# Patient Record
Sex: FEMALE | Race: WHITE | NOT HISPANIC OR LATINO | Employment: FULL TIME | URBAN - METROPOLITAN AREA
[De-identification: names, ages, dates, MRNs, and addresses within clinical notes are randomized per-mention and may not be internally consistent; named-entity substitution may affect disease eponyms.]

---

## 2017-01-12 ENCOUNTER — COMMUNICATION - RIVER FALLS (OUTPATIENT)
Dept: FAMILY MEDICINE | Facility: CLINIC | Age: 42
End: 2017-01-12

## 2017-01-12 ENCOUNTER — OFFICE VISIT - RIVER FALLS (OUTPATIENT)
Dept: FAMILY MEDICINE | Facility: CLINIC | Age: 42
End: 2017-01-12

## 2017-01-12 ASSESSMENT — MIFFLIN-ST. JEOR: SCORE: 1205.14

## 2017-01-25 ENCOUNTER — OFFICE VISIT - RIVER FALLS (OUTPATIENT)
Dept: FAMILY MEDICINE | Facility: CLINIC | Age: 42
End: 2017-01-25

## 2017-01-25 ASSESSMENT — MIFFLIN-ST. JEOR: SCORE: 1205.14

## 2017-02-13 ENCOUNTER — OFFICE VISIT - RIVER FALLS (OUTPATIENT)
Dept: FAMILY MEDICINE | Facility: CLINIC | Age: 42
End: 2017-02-13

## 2017-02-13 ASSESSMENT — MIFFLIN-ST. JEOR: SCORE: 1228.28

## 2017-02-22 ENCOUNTER — OFFICE VISIT - RIVER FALLS (OUTPATIENT)
Dept: FAMILY MEDICINE | Facility: CLINIC | Age: 42
End: 2017-02-22

## 2017-02-22 ASSESSMENT — MIFFLIN-ST. JEOR: SCORE: 1188.81

## 2017-03-17 ENCOUNTER — OFFICE VISIT - RIVER FALLS (OUTPATIENT)
Dept: FAMILY MEDICINE | Facility: CLINIC | Age: 42
End: 2017-03-17

## 2017-03-17 ASSESSMENT — MIFFLIN-ST. JEOR: SCORE: 1217.84

## 2017-05-31 ENCOUNTER — OFFICE VISIT - RIVER FALLS (OUTPATIENT)
Dept: FAMILY MEDICINE | Facility: CLINIC | Age: 42
End: 2017-05-31

## 2017-05-31 ENCOUNTER — COMMUNICATION - RIVER FALLS (OUTPATIENT)
Dept: FAMILY MEDICINE | Facility: CLINIC | Age: 42
End: 2017-05-31

## 2017-05-31 ASSESSMENT — MIFFLIN-ST. JEOR: SCORE: 1211.49

## 2017-06-21 ENCOUNTER — OFFICE VISIT - RIVER FALLS (OUTPATIENT)
Dept: FAMILY MEDICINE | Facility: CLINIC | Age: 42
End: 2017-06-21

## 2018-05-24 ENCOUNTER — OFFICE VISIT - RIVER FALLS (OUTPATIENT)
Dept: FAMILY MEDICINE | Facility: CLINIC | Age: 43
End: 2018-05-24

## 2018-05-24 ASSESSMENT — MIFFLIN-ST. JEOR: SCORE: 1173.39

## 2018-05-25 LAB
CREAT SERPL-MCNC: 0.71 MG/DL (ref 0.5–1.1)
GLUCOSE BLD-MCNC: 78 MG/DL (ref 65–99)

## 2019-01-08 ENCOUNTER — OFFICE VISIT - RIVER FALLS (OUTPATIENT)
Dept: FAMILY MEDICINE | Facility: CLINIC | Age: 44
End: 2019-01-08

## 2020-01-08 ENCOUNTER — OFFICE VISIT - RIVER FALLS (OUTPATIENT)
Dept: FAMILY MEDICINE | Facility: CLINIC | Age: 45
End: 2020-01-08
Payer: COMMERCIAL

## 2020-01-08 ASSESSMENT — MIFFLIN-ST. JEOR: SCORE: 1203.33

## 2020-01-09 ENCOUNTER — COMMUNICATION - RIVER FALLS (OUTPATIENT)
Dept: FAMILY MEDICINE | Facility: CLINIC | Age: 45
End: 2020-01-09
Payer: COMMERCIAL

## 2020-01-09 LAB
A/G RATIO - HISTORICAL: 1.6 (ref 1–2.5)
ALBUMIN SERPL-MCNC: 4.4 GM/DL (ref 3.6–5.1)
ALP SERPL-CCNC: 102 UNIT/L (ref 33–115)
ALT SERPL W P-5'-P-CCNC: 18 UNIT/L (ref 6–29)
AMYLASE SERPL-CCNC: 52 UNIT/L (ref 21–101)
AST SERPL W P-5'-P-CCNC: 17 UNIT/L (ref 10–30)
BILIRUB SERPL-MCNC: 0.5 MG/DL (ref 0.2–1.2)
BUN SERPL-MCNC: 14 MG/DL (ref 7–25)
BUN/CREAT RATIO - HISTORICAL: NORMAL (ref 6–22)
CALCIUM SERPL-MCNC: 9.5 MG/DL (ref 8.6–10.2)
CHLORIDE BLD-SCNC: 106 MMOL/L (ref 98–110)
CO2 SERPL-SCNC: 27 MMOL/L (ref 20–32)
CREAT SERPL-MCNC: 0.71 MG/DL (ref 0.5–1.1)
EGFRCR SERPLBLD CKD-EPI 2021: 104 ML/MIN/1.73M2
ERYTHROCYTE [DISTWIDTH] IN BLOOD BY AUTOMATED COUNT: 12.7 % (ref 11–15)
GLOBULIN: 2.7 (ref 1.9–3.7)
GLUCOSE BLD-MCNC: 96 MG/DL (ref 65–99)
HCT VFR BLD AUTO: 38.6 % (ref 35–45)
HGB BLD-MCNC: 12.9 GM/DL (ref 11.7–15.5)
LIPASE SERPL-CCNC: 13 UNIT/L (ref 7–60)
MCH RBC QN AUTO: 27.4 PG (ref 27–33)
MCHC RBC AUTO-ENTMCNC: 33.4 GM/DL (ref 32–36)
MCV RBC AUTO: 82 FL (ref 80–100)
PLATELET # BLD AUTO: 378 10*3/UL (ref 140–400)
PMV BLD: 9.9 FL (ref 7.5–12.5)
POTASSIUM BLD-SCNC: 4.2 MMOL/L (ref 3.5–5.3)
PROT SERPL-MCNC: 7.1 GM/DL (ref 6.1–8.1)
RBC # BLD AUTO: 4.71 10*6/UL (ref 3.8–5.1)
SODIUM SERPL-SCNC: 139 MMOL/L (ref 135–146)
WBC # BLD AUTO: 6.3 10*3/UL (ref 3.8–10.8)

## 2020-02-19 ENCOUNTER — OFFICE VISIT - RIVER FALLS (OUTPATIENT)
Dept: FAMILY MEDICINE | Facility: CLINIC | Age: 45
End: 2020-02-19
Payer: COMMERCIAL

## 2020-02-19 ASSESSMENT — MIFFLIN-ST. JEOR: SCORE: 1208.77

## 2020-03-13 ENCOUNTER — OFFICE VISIT - RIVER FALLS (OUTPATIENT)
Dept: FAMILY MEDICINE | Facility: CLINIC | Age: 45
End: 2020-03-13
Payer: COMMERCIAL

## 2020-03-13 LAB — DEPRECATED S PYO AG THROAT QL EIA: NEGATIVE

## 2020-03-13 ASSESSMENT — MIFFLIN-ST. JEOR: SCORE: 1205.14

## 2020-03-15 LAB — BACTERIA SPEC CULT: NORMAL

## 2021-02-01 ENCOUNTER — OFFICE VISIT - RIVER FALLS (OUTPATIENT)
Dept: FAMILY MEDICINE | Facility: CLINIC | Age: 46
End: 2021-02-01
Payer: COMMERCIAL

## 2021-02-01 ENCOUNTER — AMBULATORY - RIVER FALLS (OUTPATIENT)
Dept: FAMILY MEDICINE | Facility: CLINIC | Age: 46
End: 2021-02-01
Payer: COMMERCIAL

## 2021-02-01 ENCOUNTER — COMMUNICATION - RIVER FALLS (OUTPATIENT)
Dept: FAMILY MEDICINE | Facility: CLINIC | Age: 46
End: 2021-02-01
Payer: COMMERCIAL

## 2021-02-04 LAB — SARS-COV-2 RNA RESP QL NAA+PROBE: NEGATIVE

## 2021-03-25 ENCOUNTER — AMBULATORY - RIVER FALLS (OUTPATIENT)
Dept: FAMILY MEDICINE | Facility: CLINIC | Age: 46
End: 2021-03-25
Payer: COMMERCIAL

## 2021-03-25 ENCOUNTER — OFFICE VISIT - RIVER FALLS (OUTPATIENT)
Dept: FAMILY MEDICINE | Facility: CLINIC | Age: 46
End: 2021-03-25
Payer: COMMERCIAL

## 2021-03-29 LAB — SARS-COV-2 RNA RESP QL NAA+PROBE: NEGATIVE

## 2021-04-20 ENCOUNTER — COMMUNICATION - RIVER FALLS (OUTPATIENT)
Dept: FAMILY MEDICINE | Facility: CLINIC | Age: 46
End: 2021-04-20
Payer: COMMERCIAL

## 2021-08-29 ENCOUNTER — OFFICE VISIT - RIVER FALLS (OUTPATIENT)
Dept: FAMILY MEDICINE | Facility: CLINIC | Age: 46
End: 2021-08-29
Payer: COMMERCIAL

## 2021-08-29 ASSESSMENT — MIFFLIN-ST. JEOR: SCORE: 1259.58

## 2021-08-30 ENCOUNTER — LAB REQUISITION (OUTPATIENT)
Dept: LAB | Facility: CLINIC | Age: 46
End: 2021-08-30
Payer: COMMERCIAL

## 2021-08-30 DIAGNOSIS — U07.1 COVID-19: ICD-10-CM

## 2021-08-30 LAB — DEPRECATED S PYO AG THROAT QL EIA: NEGATIVE

## 2021-08-30 PROCEDURE — U0003 INFECTIOUS AGENT DETECTION BY NUCLEIC ACID (DNA OR RNA); SEVERE ACUTE RESPIRATORY SYNDROME CORONAVIRUS 2 (SARS-COV-2) (CORONAVIRUS DISEASE [COVID-19]), AMPLIFIED PROBE TECHNIQUE, MAKING USE OF HIGH THROUGHPUT TECHNOLOGIES AS DESCRIBED BY CMS-2020-01-R: HCPCS | Mod: ORL | Performed by: PHYSICIAN ASSISTANT

## 2021-08-31 LAB — SARS-COV-2 RNA RESP QL NAA+PROBE: NEGATIVE

## 2021-09-01 ENCOUNTER — OFFICE VISIT - RIVER FALLS (OUTPATIENT)
Dept: FAMILY MEDICINE | Facility: CLINIC | Age: 46
End: 2021-09-01
Payer: COMMERCIAL

## 2021-09-01 LAB
BACTERIA SPEC CULT: NORMAL
SARS-COV-2 RNA RESP QL NAA+PROBE: NEGATIVE

## 2021-09-01 ASSESSMENT — MIFFLIN-ST. JEOR: SCORE: 1260.48

## 2021-09-13 ENCOUNTER — COMMUNICATION - RIVER FALLS (OUTPATIENT)
Dept: FAMILY MEDICINE | Facility: CLINIC | Age: 46
End: 2021-09-13
Payer: COMMERCIAL

## 2022-01-04 ENCOUNTER — OFFICE VISIT - RIVER FALLS (OUTPATIENT)
Dept: FAMILY MEDICINE | Facility: CLINIC | Age: 47
End: 2022-01-04
Payer: COMMERCIAL

## 2022-01-04 ENCOUNTER — LAB REQUISITION (OUTPATIENT)
Dept: LAB | Facility: CLINIC | Age: 47
End: 2022-01-04
Payer: COMMERCIAL

## 2022-01-04 DIAGNOSIS — U07.1 COVID-19: ICD-10-CM

## 2022-01-04 PROCEDURE — U0003 INFECTIOUS AGENT DETECTION BY NUCLEIC ACID (DNA OR RNA); SEVERE ACUTE RESPIRATORY SYNDROME CORONAVIRUS 2 (SARS-COV-2) (CORONAVIRUS DISEASE [COVID-19]), AMPLIFIED PROBE TECHNIQUE, MAKING USE OF HIGH THROUGHPUT TECHNOLOGIES AS DESCRIBED BY CMS-2020-01-R: HCPCS | Mod: ORL | Performed by: PHYSICIAN ASSISTANT

## 2022-01-05 LAB — SARS-COV-2 RNA RESP QL NAA+PROBE: POSITIVE

## 2022-01-06 LAB — SARS-COV-2 RNA RESP QL NAA+PROBE: POSITIVE

## 2022-01-24 ENCOUNTER — OFFICE VISIT - RIVER FALLS (OUTPATIENT)
Dept: FAMILY MEDICINE | Facility: CLINIC | Age: 47
End: 2022-01-24
Payer: COMMERCIAL

## 2022-01-25 ENCOUNTER — COMMUNICATION - RIVER FALLS (OUTPATIENT)
Dept: FAMILY MEDICINE | Facility: CLINIC | Age: 47
End: 2022-01-25
Payer: COMMERCIAL

## 2022-01-25 LAB
ALBUMIN UR-MCNC: NEGATIVE G/DL
APPEARANCE UR: CLEAR
BACTERIA #/AREA URNS HPF: ABNORMAL /HPF
BILIRUB UR QL STRIP: NEGATIVE
BUN SERPL-MCNC: 16 MG/DL (ref 7–25)
BUN/CREAT RATIO - HISTORICAL: NORMAL (ref 6–22)
CALCIUM SERPL-MCNC: 9.4 MG/DL (ref 8.6–10.2)
CHLORIDE BLD-SCNC: 106 MMOL/L (ref 98–110)
CO2 SERPL-SCNC: 25 MMOL/L (ref 20–32)
COLOR UR AUTO: YELLOW
CREAT SERPL-MCNC: 0.71 MG/DL (ref 0.5–1.1)
EGFRCR SERPLBLD CKD-EPI 2021: 102 ML/MIN/1.73M2
GLUCOSE BLD-MCNC: 75 MG/DL (ref 65–99)
GLUCOSE UR STRIP-MCNC: NEGATIVE MG/DL
HGB UR QL STRIP: NEGATIVE
HYALINE CASTS #/AREA URNS LPF: ABNORMAL /LPF
KETONES UR STRIP-MCNC: ABNORMAL MG/DL
LEUKOCYTE ESTERASE UR QL STRIP: NEGATIVE
NITRATE UR QL: NEGATIVE
PH UR STRIP: 6 [PH] (ref 5–8)
POTASSIUM BLD-SCNC: 4.1 MMOL/L (ref 3.5–5.3)
RBC #/AREA URNS AUTO: ABNORMAL /HPF
SODIUM SERPL-SCNC: 140 MMOL/L (ref 135–146)
SP GR UR STRIP: 1.02 (ref 1–1.03)
SQUAMOUS #/AREA URNS AUTO: ABNORMAL /HPF
TSH SERPL DL<=0.005 MIU/L-ACNC: 1.37 MIU/L
WBC #/AREA URNS AUTO: ABNORMAL /HPF

## 2022-01-30 ENCOUNTER — COMMUNICATION - RIVER FALLS (OUTPATIENT)
Dept: FAMILY MEDICINE | Facility: CLINIC | Age: 47
End: 2022-01-30
Payer: COMMERCIAL

## 2022-02-11 VITALS
HEART RATE: 81 BPM | SYSTOLIC BLOOD PRESSURE: 118 MMHG | BODY MASS INDEX: 30.43 KG/M2 | TEMPERATURE: 97.9 F | TEMPERATURE: 98.1 F | BODY MASS INDEX: 30.47 KG/M2 | WEIGHT: 155 LBS | HEIGHT: 60 IN | SYSTOLIC BLOOD PRESSURE: 111 MMHG | WEIGHT: 155.2 LBS | OXYGEN SATURATION: 98 % | HEIGHT: 60 IN | HEART RATE: 82 BPM | RESPIRATION RATE: 18 BRPM | DIASTOLIC BLOOD PRESSURE: 76 MMHG | DIASTOLIC BLOOD PRESSURE: 64 MMHG

## 2022-02-12 VITALS
DIASTOLIC BLOOD PRESSURE: 60 MMHG | TEMPERATURE: 97.4 F | OXYGEN SATURATION: 99 % | HEART RATE: 76 BPM | WEIGHT: 142.6 LBS | WEIGHT: 143.8 LBS | DIASTOLIC BLOOD PRESSURE: 70 MMHG | HEIGHT: 60 IN | BODY MASS INDEX: 28 KG/M2 | HEART RATE: 74 BPM | BODY MASS INDEX: 28.23 KG/M2 | HEIGHT: 60 IN | TEMPERATURE: 98.1 F | OXYGEN SATURATION: 98 % | SYSTOLIC BLOOD PRESSURE: 110 MMHG | SYSTOLIC BLOOD PRESSURE: 116 MMHG

## 2022-02-12 VITALS
TEMPERATURE: 99.1 F | HEIGHT: 60 IN | HEART RATE: 84 BPM | WEIGHT: 136 LBS | BODY MASS INDEX: 26.7 KG/M2 | SYSTOLIC BLOOD PRESSURE: 100 MMHG | DIASTOLIC BLOOD PRESSURE: 62 MMHG

## 2022-02-12 VITALS
HEART RATE: 100 BPM | HEIGHT: 60 IN | HEIGHT: 60 IN | SYSTOLIC BLOOD PRESSURE: 104 MMHG | BODY MASS INDEX: 28.35 KG/M2 | HEART RATE: 87 BPM | TEMPERATURE: 99.7 F | DIASTOLIC BLOOD PRESSURE: 60 MMHG | SYSTOLIC BLOOD PRESSURE: 104 MMHG | DIASTOLIC BLOOD PRESSURE: 66 MMHG | TEMPERATURE: 98.8 F | OXYGEN SATURATION: 97 % | WEIGHT: 144.4 LBS | WEIGHT: 145.8 LBS | BODY MASS INDEX: 28.62 KG/M2

## 2022-02-12 VITALS
SYSTOLIC BLOOD PRESSURE: 108 MMHG | HEART RATE: 84 BPM | HEIGHT: 60 IN | HEART RATE: 80 BPM | BODY MASS INDEX: 29.08 KG/M2 | BODY MASS INDEX: 28.07 KG/M2 | WEIGHT: 148.1 LBS | DIASTOLIC BLOOD PRESSURE: 64 MMHG | HEART RATE: 72 BPM | HEIGHT: 60 IN | DIASTOLIC BLOOD PRESSURE: 73 MMHG | SYSTOLIC BLOOD PRESSURE: 100 MMHG | TEMPERATURE: 97.9 F | WEIGHT: 139.4 LBS | HEIGHT: 60 IN | TEMPERATURE: 98.6 F | BODY MASS INDEX: 28.07 KG/M2 | HEART RATE: 68 BPM | TEMPERATURE: 98.6 F | DIASTOLIC BLOOD PRESSURE: 64 MMHG | SYSTOLIC BLOOD PRESSURE: 114 MMHG | BODY MASS INDEX: 27.37 KG/M2 | HEIGHT: 60 IN | WEIGHT: 143 LBS | WEIGHT: 143 LBS | DIASTOLIC BLOOD PRESSURE: 62 MMHG | SYSTOLIC BLOOD PRESSURE: 110 MMHG

## 2022-02-12 VITALS
DIASTOLIC BLOOD PRESSURE: 68 MMHG | SYSTOLIC BLOOD PRESSURE: 102 MMHG | HEART RATE: 88 BPM | TEMPERATURE: 99.1 F | BODY MASS INDEX: 27.97 KG/M2 | WEIGHT: 143.2 LBS

## 2022-02-12 VITALS
BODY MASS INDEX: 28.07 KG/M2 | TEMPERATURE: 98.6 F | HEART RATE: 72 BPM | SYSTOLIC BLOOD PRESSURE: 112 MMHG | DIASTOLIC BLOOD PRESSURE: 60 MMHG | WEIGHT: 143 LBS | HEIGHT: 60 IN

## 2022-02-12 VITALS
SYSTOLIC BLOOD PRESSURE: 102 MMHG | WEIGHT: 142.6 LBS | TEMPERATURE: 99.5 F | HEART RATE: 73 BPM | BODY MASS INDEX: 27.85 KG/M2 | DIASTOLIC BLOOD PRESSURE: 60 MMHG | OXYGEN SATURATION: 98 %

## 2022-02-15 NOTE — LETTER
(Inserted Image. Unable to display)                                                                                                [address] [facility] [address]                                                January 24, 2020Re: CAMRYN EMIL1975 [facility] [address] [place] [address] 14055Xo: Dr. ChowdhuryThe following patient has been referred to your office/practice:  CAMRYN STEIN Appointment:  1/29/2020 @3:30pmPlease refer to the attached  clinical documentation for a summary of CAMRYN's care.  Please do not hesitate to contact our office if any additional clinical questions arise.  All relevant records and transition of care documents should be mailed or faxed.Your assistance in providing continuity of care is appreciated. Sincerely, [facility] [place] [place] [address](P) [phone](F) [phone]

## 2022-02-15 NOTE — TELEPHONE ENCOUNTER
---------------------  From: Willi LOZANO, Annabelle FELIZ (Phone Messages Pool ([id number]_[place]))   To: Phone Messages Pool ([id number]_[place]);     Sent: 1/9/2020 11:42:41 AM CST  Subject: Call back     Patient LVGRADY at 1139 stating she missed a call.    It is OK to LVGRADY on phone [phone] with any information.    Appears BROOKLYNN was going to call with results?---------------------  From: Amanda Snyder CMA (Phone Messages Pool ([id number]_[place]))   To: Sergei Correa PA-C;     Sent: 1/9/2020 12:11:37 PM CST  Subject: FW: Call back---------------------  From: Sergei Correa PA-C   To: Phone Messages Pool ([id number]_[facility]);     Sent: 1/9/2020 2:17:17 PM CST  Subject: RE: Call back     I sent her labs through portal. Perhaps referrals or hospital calling her to set up HIDA scan.    Alisia. Let her know this.

## 2022-02-15 NOTE — TELEPHONE ENCOUNTER
---------------------  From: Senait Melara CMA (eRx Pool ([id number]_[place]))   To: Sergei Correa PA-C;     Sent: 4/13/2020 9:21:54 AM CDT  Subject: FW: Medication Management   Due Date/Time: 4/12/2020 12:48:00 PM CDT             ** Patient matched by Senait Melara CMA on 4/13/2020 9:21:19 AM CDT **      ------------------------------------------  From: [employer]  To: Sergei Correa PA-C  Sent: April 11, 2020 12:48:58 PM CDT  Subject: Medication Management  Due: April 12, 2020 12:48:58 PM CDT    ** On Hold Pending Signature **  Drug: fluticasone nasal (fluticasone 50 mcg/inh nasal spray)  ****PT NEEDS NEW RX***************  Quantity: 1 gm  Days Supply: 1  Refills: 0  Substitutions Allowed  Notes from Pharmacy:     Dispensed Drug: fluticasone nasal (fluticasone 50 mcg/inh nasal spray)  ****PT NEEDS NEW RX***************  Quantity: 1 gm  Days Supply: 1  Refills: 0  Substitutions Allowed  Notes from Pharmacy:   ---------------------------------------------------------------  From: Sergei Correa PA-C   To: [employer]    Sent: 4/13/2020 9:30:31 AM CDT  Subject: FW: Medication Management     ** Approved **  fluticasone nasal (fluticasone propionate 50 mcg/actuation nasal spray,suspension)  ****PT NEEDS NEW RX***************  Qty:  1 gm        Days Supply:  1        Refills:  0          Substitutions Allowed     Route To Pharmacy - [facility]

## 2022-02-15 NOTE — PROGRESS NOTES
Patient:   CAMRYN STEIN            MRN: [id number]            FIN: [id number]               Age:   43 years     Sex:  Female     :  1975   Associated Diagnoses:   Recurrent maxillary sinusitis   Author:   Sergei Correa PA-C      Report Summary   Diagnosis  Recurrent maxillary sinusitis (TWB84-XC J01.01).  Patient InstructionsOrders   Visit Information      Primary Care Provider (PCP):  Sergei Correa PA-C    NPI# [id number]   Visit type:  New symptom.    Source of history:  Self.    Referral source:  Self.    History limitation:  None.       Chief Complaint   2019 10:58 AM CST    c/o fever, cough, sore throat, nasal congestion and runny nose x 3 days        History of Present Illness             The patient presents with sinus problem.  The sinus problem is located in the maxillary sinus.  The sinus problem is characterized by nasal congestion, rhinorrhea, headache and facial pain.  The severity of the sinus problem is moderate.  The sinus problem is episodic, fluctuates in intensity and is worsening.  The sinus problem has lasted for 6 day(s).  Associated symptoms consist of cough and sore throat.  Watery right eye with decreased vision. Has not had eye exam in a number of years. CC above noted and confirmed with the patient..        Review of Systems   Constitutional:  Negative except as documented in history of present illness.    Eye:  Negative except as documented in history of present illness.    Ear/Nose/Mouth/Throat:  Negative except as documented in history of present illness.    Respiratory:  Negative except as documented in history of present illness.       Health Status   Allergies:    Allergic Reactions (All)  Severity Not Documented  Amoxicillin (No reactions were documented)  Doxycycline (Rash, itchy)  Penicillin (No reactions were documented)  Sulfa drug (No reactions were documented)  Canceled/Inactive Reactions (All)  No known allergies   Medications:  (Selected)    Prescriptions  Prescribed  Nasonex 50 mcg/inh nasal spray: 2 spray(s), nasal, daily, PRN: for allergy symptoms, # 1 EA, 11 Refill(s), Type: Maintenance, Pharmacy: [id number], 2 spray(s) nasal daily,PRN:for allergy symptoms  Singulair 10 mg oral tablet: 1 tab(s) ( 10 mg ), PO, qPM, # 30 tab(s), 11 Refill(s), Type: Maintenance, Pharmacy: [facility] #[id number], 1 tab(s) po qpm  Xyzal 5 mg oral tablet: 1 tab(s) ( 5 mg ), po, qpm, # 30 tab(s), 11 Refill(s), Type: Maintenance, Pharmacy: [facility] #[id number], 1 tab(s) po qpm  Zithromax 250 mg oral tablet: = 1 packet(s), PO, Once, Instructions: as directed on package labeling. Two tablets po on day one, then one tablet daily x four days, # 6 tab(s), 0 Refill(s), Type: Soft Stop, Pharmacy: [facility] #[id number], 1 packet(s) Oral once,Instr:as directed on...  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  Comments:  3/18/2013 CDT 12:14 PM CDT - Marlene WILSON, [facility]  Colposcopy neg. OK to re-pap one year.  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  Depression, Recurrent (296.30)  Tobacco user (305.1)  Family History of Thyroid Disease  (V18.19)  Resolved  Chicken Pox (052.9): Onset in 1983 at 7 years.  Resolved.  Asthma (493.90):  Resolved.  Seasonal Allergies (477.9):  Resolved.  Pregnancy (882316718):  Resolved in 1995 at 19 years.   Family History:    Diabetes mellitus  Mother  Hypertension  Father  Depression  Mother  Aunt     Procedure history:    Vaginal hysterectomy (694396211) on 2/17/2017 at 42 Years.  LEEP ([id number]) on 5/10/2004 at 29 Years.  bunionectomy with osteotomy base of left foot in 2004 at 29 Years.  Colposcopy ([id number]).  Comments:  10/19/2012 1:18 PM CDT - Carleen Washington  '93, '95, '04   Social History:        Alcohol Assessment: Denies Alcohol Use                     Comments:                      02/22/2017 - Yissel Gallagher LPN                     Denies alcohol use      Tobacco Assessment: Current                     Comments:                      02/14/2013 - Staci Lisa CMA                     5 per day      Substance Abuse Assessment: Denies Substance Abuse      Employment and Education Assessment            Work/School description: .      Home and Environment Assessment            Marital status: .  Spouse/Partner name: Ramesh.      Exercise and Physical Activity Assessment: Does not exercise            Exercise frequency: Never.      Sexual Assessment            Sexually active: Yes.      Other Assessment            First menses age 12.  Regular menses.  Menstrual duration 3-5 days.  Cycle interval 28-30 days.  History of               abnormal Pap smear.      Physical Examination   Vital Signs   1/8/2019 10:58 AM CST Temperature Tympanic 99.5 DegF    Peripheral Pulse Rate 100 bpm    HR Method Electronic    Systolic Blood Pressure 102 mmHg    Diastolic Blood Pressure 60 mmHg    Mean Arterial Pressure 74 mmHg    BP Site Left arm    BP Method Manual    Oxygen Saturation 98 %      Measurements from flowsheet : Measurements   1/8/2019 10:58 AM CST    Weight Measured - Standard                142.6 lb      General:  Alert and oriented, No acute distress.    Eye:  Pupils are equal, round and reactive to light, Extraocular movements are intact, Normal conjunctiva.    HENT:  Normocephalic, Tympanic membranes are clear, Oral mucosa is moist.         Nose: Left nostril, Discharge ( Moderate amount, Green ).         Sinus: Left, Maxillary sinus, Tenderness.    Neck:  Supple, Non-tender, No lymphadenopathy.    Respiratory:  Lungs are clear to auscultation, Respirations are non-labored.    Cardiovascular:  Normal rate, Regular rhythm, No murmur.       Impression and Plan   Diagnosis     Recurrent maxillary sinusitis (QEU25-BL J01.01).     Patient Instructions:       Counseled: Patient, Regarding diagnosis, Regarding medications, Activity, Verbalized understanding.    Orders     Orders (Selected)   Prescriptions  Prescribed  Zithromax 250 mg oral tablet: = 1 packet(s), PO, Once, Instructions: as directed on package labeling. Two tablets po on day one, then one tablet daily x four days, # 6 tab(s), 0 Refill(s), Type: Soft Stop, Pharmacy: [facility] #[id number], 1 packet(s) Oral once,Instr:as directed on....     Take medicine as prescribed, side effects discussed.  Tylenol/ibuprofen for fever and discomfort.  Push fluids.  RTC if not improving in 36-48 hours, prior if concerns as we have discussed.  To see eye doctor for eye complaints above.

## 2022-02-15 NOTE — NURSING NOTE
Rapid Strep POC Entered On:  8/30/2021 8:08 AM CDT    Performed On:  8/29/2021 8:08 AM CDT by Tatianna Caruso               Rapid Strep POC   Rapid Strep POC :   Negative   POC Test Comments :   Performed at [facility]   Taitanna Caruso - 8/30/2021 8:08 AM CDT

## 2022-02-15 NOTE — PROGRESS NOTES
Patient:   CAMRYN STEIN            MRN: [id number]            FIN: [id number]               Age:   45 years     Sex:  Female     :  1975   Associated Diagnoses:   Acute recurrent maxillary sinusitis; Spider veins of limb   Author:   Sergei Correa PA-C      Report Summary   Diagnosis  Acute recurrent maxillary sinusitis (KHM29-RC J01.01).  Patient InstructionsOrders   Visit Information      Date of Service: 2020 01:32 pm  Performing Location: [facility]  Encounter#: [id number]      Primary Care Provider (PCP):  Sergei Correa PA-C    NPI# [id number]      Referring Provider:  Sergei Correa PA-C    NPI# [id number]   Visit type:  New symptom.    Source of history:  Self.    Referral source:  Self.    History limitation:  None.       Chief Complaint   2020 1:37 PM CST    c/o HA, sinus pressure, ear pain, sore throat x 1 week; getting worse; tried OTC sudafed, aspirin        History of Present Illness             The patient presents with sinus problem.  The sinus problem is located in the maxillary sinus.  The sinus problem is characterized by nasal congestion, rhinorrhea and facial pain.  The severity of the sinus problem is moderate.  The sinus problem is episodic, fluctuates in intensity and is worsening.  The sinus problem has lasted for 1 week(s).  Associated symptoms consist of cough and sore throat.  CC above noted and confirmed with the patient..  Check spider veins of right leg. On feet all day at work. Legs occasionally ache at night. Dry skin..        Review of Systems   Constitutional:  Negative except as documented in history of present illness.    Eye:  Negative.    Ear/Nose/Mouth/Throat:  Negative except as documented in history of present illness.    Respiratory:  Negative except as documented in history of present illness.       Health Status   Allergies:    Allergic Reactions (All)  Severity Not Documented  Amoxicillin (No reactions were documented)  Doxycycline (Rash,  itchy)  Penicillin (No reactions were documented)  Sulfa drug (No reactions were documented)  Canceled/Inactive Reactions (All)  No known allergies   Medications:  (Selected)   Prescriptions  Prescribed  Zithromax 250 mg oral tablet: = 1 packet(s), PO, Once, Instructions: as directed on package labeling. Two tablets po on day one, then one tablet daily x four days, # 6 tab(s), 0 Refill(s), Type: Soft Stop, Pharmacy: [facility] , 1 packet(s) Oral once,Instr:...,    Medications          *denotes recorded medication          Zithromax 250 mg oral tablet: 1 packet(s), PO, Once, as directed on package labeling. Two tablets po on day one, then one tablet daily x four days, 6 tab(s), 0 Refill(s).       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  Comments:  3/18/2013 CDT 12:14 PM CDT - Marlene WILSON, [facility]  Colposcopy neg. OK to re-pap one year.  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  Depression, Recurrent (296.30)  Tobacco user (305.1)  Family History of Thyroid Disease (V18.19)  Resolved  Chicken Pox (052.9): Onset in 1983 at 7 years.  Resolved.  Asthma (493.90):  Resolved.  Seasonal Allergies (477.9):  Resolved.  Pregnancy (577480796):  Resolved in 1995 at 19 years.   Family History:    Diabetes  mellitus  Mother  Hypertension  Father  Depression  Mother  Aunt     Procedure history:    Vaginal hysterectomy ([id number]) on 2/17/2017 at 42 Years.  LEEP ([id number]) on 5/10/2004 at 29 Years.  bunionectomy with osteotomy base of left foot in 2004 at 29 Years.  Colposcopy ([id number]).  Comments:  10/19/2012 1:18 PM CDT - Carleen Washington  '93, '95, '04   Social History:        Alcohol Assessment: Denies Alcohol Use                     Comments:                      02/22/2017 - Yissel Gallagher LPN                     Denies alcohol use      Tobacco Assessment: Current                     Comments:                      02/14/2013 - Staci Lisa CMA                     5 per day      Substance Abuse Assessment: Denies Substance Abuse      Employment and Education Assessment            Work/School description: .      Home and Environment Assessment            Marital status: .  Spouse/Partner name: Ramesh.      Exercise and Physical Activity Assessment: Does not exercise            Exercise frequency: Never.      Sexual Assessment            Sexually active: Yes.      Other Assessment            First menses age 12.  Regular menses.  Menstrual duration 3-5 days.  Cycle interval 28-30 days.  History of               abnormal Pap smear.        Physical Examination   Vital Signs   2/19/2020 1:37 PM CST Temperature Tympanic 98.1 DegF    Peripheral Pulse Rate 76 bpm    HR Method Electronic    Systolic Blood Pressure 110 mmHg    Diastolic Blood Pressure 70 mmHg    Mean Arterial Pressure 83 mmHg    BP Site Left arm    BP Method Manual    Oxygen Saturation 99 %      Measurements from flowsheet : Measurements   2/19/2020 1:37 PM CST Height Measured - Standard 60 in    Weight Measured - Standard 143.8 lb    BSA 1.66 m2    Body Mass Index 28.08 kg/m2  HI      General:  Alert and oriented, No acute distress.    Eye:  Pupils are equal, round and reactive to light, Extraocular movements are intact, Normal  conjunctiva.    HENT:  Normocephalic, Tympanic membranes are clear, Oral mucosa is moist.         Nose: Both nostrils, Discharge ( Moderate amount, Green ).         Sinus: Bilateral, Maxillary sinus, Tenderness.    Neck:  Supple, Non-tender, No lymphadenopathy.    Respiratory:  Lungs are clear to auscultation, Respirations are non-labored.    Cardiovascular:  Normal rate, Regular rhythm, No murmur.    Integumentary:  Spider veins lateral right leg just inferior to the knee. Superficial varicosities right leg. No edema. No erythema. No calf pain. Dry hands and legs.       Impression and Plan   Diagnosis     Acute recurrent maxillary sinusitis (WGI67-RG J01.01).     Spider veins of limb (YFS68-EQ I78.1).     Patient Instructions:       Counseled: Patient, Regarding diagnosis, Regarding medications, Activity, Verbalized understanding.    Orders     Orders (Selected)   Prescriptions  Prescribed  Zithromax 250 mg oral tablet: = 1 packet(s), PO, Once, Instructions: as directed on package labeling. Two tablets po on day one, then one tablet daily x four days, # 6 tab(s), 0 Refill(s), Type: Soft Stop, Pharmacy: [facility] , 1 packet(s) Oral once,Instr:....     Take medicine as prescribed, side effects discussed.  Tylenol/ibuprofen for fever and discomfort.  Push fluids.  RTC if not improving in 36-48 hours, prior if concerns as we have discussed.  Compression stockings. Tepid water for showers. Short showers. Moisturizing creams. FU PRN. Elevate legs in melissa if seated.

## 2022-02-15 NOTE — TELEPHONE ENCOUNTER
---------------------  From: Fatoumata Ramirez LPN (Phone Messages Pool ([id number]_[facility]))   Sent: 1/10/2020 12:33:08 PM CST  Subject: General Message     Phone Message    PCP:   KAH      Time of Call:  11:55am       Person Calling:  pt  Phone number:  [phone]    Returned call at:    Note:   Pt LM stating she is suppose to be getting a call to schedule a procedure. Pt says she needs to know what the procedure is and if there is a code so that she can contact her insurance to see if it will be covered.    Retuned call and informed pt order is for NM Hepatobiliary or HIDA scan. Dx code on order is abdominal pain (R10.9).    Told pt order was brought to [facility] today so she should be receiving call.    Last office visit and reason:  1/8/2020 Upper abdominal pain

## 2022-02-15 NOTE — TELEPHONE ENCOUNTER
---------------------  From: Reina RAVI Tiffanie (Phone Messages Pool ([id number]))   To: KAH Message [employer] ([id number]);     Sent: 3/24/2021 9:38:22 AM CDT  Subject: General Message--? sinus inf/bronchitis     Phone Message    PCP:   KAH      Time of Call:  0909       Person Calling:  self  Phone number:  [phone]      Note:   pt had a visit 2/1 and was tested negative for Covid, Z-waqar given.  States she feels she still has sinus inf/bronchitis - c/o sinus pressure, cough, increased nasal drainage in the am with green d/c.  Prod cough with white to clear discharge.  Was treating for allergies with no improvement with symptoms (has allergies all year round).  denies any temp.  Had ST Sun/Mon which has now cleared Started Tylenol cold/flu with nor relief of sx.  Hoping she can avoid another OV.  Please advise      Allergy PCN, Sulfa, Doxy       Last office visit and reason:  2/1---------------------  From: Bradley Cummings LPN ([employer] Message Pool ([id number]))   To: [employer] Message Pool ([id number]);     Sent: 3/24/2021 1:41:48 PM CDT  Subject: FW: General Message--? sinus inf/bronchitis     Please advise if the patient should be seen in clinic for evaluation or if a virtual visit will suffice.---------------------  From: Bradley Cummings LPN ([employer] Message Pool ([id number]))   To: Appointment Pool ([id number]_[place]);     Sent: 3/24/2021 2:13:25 PM CDT !  Subject: FW: General Message--? sinus inf/bronchitis     Spoke with provider who wants patient scheduled for a video or phone visit this afternoon.  Routing to scheduling.patient wants to call her insurance to see how much they will cover of this call before scheduling.  She will contact us.done

## 2022-02-15 NOTE — PROGRESS NOTES
Patient:   LIBERTY WILLIAMSON            MRN: [id number]            FIN: [id number]               Age:   42 years     Sex:  Female     :  1975   Associated Diagnoses:   None   Author:   Sp GUTIERREZ, Jordana Baron   Ms. Liberty Williamson is a 42 year old female who presents for post-op visit following vaginal hysterectomy with Dr. Ge on 17.  The indication was severe dysmenorrhea and metromenorrhagia.  Liberty is progressing generally well for POD #5.  She continues to experience pelvic cramping, but this is slowly plateauing.  She was taking oxycodone 5 mg q4hr for this on POD#2-3, but has now switched to alternating hot/cool compresses and Aleve for managing the pain.  She did take a Percocet yesterday evening that she had at home, and this was very helpful in relieving the cramps.  She had some light spotting post-operatively, which had been tapering off  since ; she is still wearing pads due to occassional bladder leakage (which is a problem for her at baseline and is unhanged since the surgery).   She is having regular bowel movements with Senna. Her appetite is decreased, but she still manages to eat a few small meals during the day. Denies chest pain, shortness of breath, or leg pain or swelling.     Liberty's PHQ-9 today revealed a score of 5.  She has a history of depression and has been on Bupropion for this and cocomitant indication of smoking cessation since 2016.  She reports noting improvement in her urge to smoke since being on the medication-- and has been successful in reducing the number of cigarettes she is smoking on an average day, now down to ~4-- but has noticed little impact on her depressive symptoms.  Her primary symptoms today are anhedonia, feeling down/hopeless, lack of energy, and avoidance of daily activities such as getting out of bed or going to work--with resultant anxiety from the procrastination.  She denies suicidal or homicidal ideation.  She would be  interested in switching her antidepressant to Prozac, which she believes was helpful for her in the past.         Health Status   Allergies:    Allergic Reactions (Selected)  Severity Not Documented  Amoxicillin (No reactions were documented)  Doxycycline (Rash, itchy)  Penicillin (No reactions were documented)  Sulfa drug (No reactions were documented)   Medications:  (Selected)   Prescriptions  Prescribed  FLUoxetine 20 mg oral tablet: 1 tab(s) ( 20 mg ), PO, Daily, # 30 tab(s), 1 Refill(s), Type: Maintenance, Pharmacy: [facility] #[id number], 1 tab(s) po daily  meclizine 25 mg oral tablet: 1 tab(s) ( 25 mg ), PO, TID, PRN: for dizziness, # 30 tab(s), 0 Refill(s), Type: Maintenance, Pharmacy: [facility] #[id number], 1 tab(s) po tid,PRN:for dizziness  omeprazole 20 mg oral delayed release capsule: 1 cap(s) ( 20 mg ), PO, Daily, # 90 cap(s), 0 Refill(s), Type: Maintenance, Pharmacy: [facility] #[id number], 1 cap(s) po daily  Documented Medications  Documented  oxyCODONE: po, 0 Refill(s), Type: Maintenance   Problem list:    All Problems (Selected)  ASCUS with positive high risk HPV / ICD-9-.15 / Confirmed  Depression, Recurrent / ICD-9-.30 / Confirmed  Family History of Thyroid Disease / ICD-9-CM V18.19 / Confirmed  LGSIL on Pap Smear / ICD-9-.03 / Confirmed  Tobacco user / ICD-9-.1 / Confirmed      Objective   Vital Signs   2/22/2017 9:17 AM CST Temperature Temporal 98.6 DegF    Peripheral Pulse Rate 84 bpm    Pulse Site Radial artery    Systolic Blood Pressure 110 mmHg    Diastolic Blood Pressure 64 mmHg    Mean Arterial Pressure 79 mmHg    BP Site Right arm    BP Method Manual      Measurements from flowsheet : Measurements   2/22/2017 9:17 AM CST Height Measured - Standard 60 in    Weight Measured - Standard 139.4 lb    BSA 1.63 m2    Body Mass Index 27.22 kg/m2      Gen: Alert, oriented, middle-aged woman. Walking around exam room and shifting in seat, trying to get comfortable.   CV:  Regular rate and rhythm, no murmurs, rubs, or gallops.   Pulm: Lungs clear to auscultation bilaterally. No wheezes or crackles.   Abd: Soft, non-distended, non-tender abdomen.   LE: No edema.       Results Review   No new studies today.       Impression and Plan   Ms. Liberty Williamson is a 42 year old female who presents for post-operative check following vaginal hysterectomy for dysmenorrhea and metromenorrhagia on 2/17/17.  She is progressing as expected for POD#5.  She notes her depression is not adequately treated on current regimen,with residual symptoms of fatigue, depressed mood, and avoidance of daily activities.      1. Post-operative check s/p vaginal hysterectomy  -Progressing as expected  -Encouraged her to take OTC Aleve 220 mg twice daily for pain control  -Oxycodone 5 mg tab at night as needed for breakthrough pain control prior to sleep    2. Depression  -Discontinue bupropion due to lack of effect on depressive symptoms  -Start fluoxetine 20 mg daily  -Instructed to contact clinic in approximately 4 weeks to reassess depressive symptoms.         Staci Gaffney, Medical Student     Patient was seen with student and history and exam confirmed. Agree that documentation reflects findings and plan.  Jordana Snowden MD

## 2022-02-15 NOTE — NURSING NOTE
Rapid Strep POC Entered On:  3/13/2020 4:47 PM CDT    Performed On:  3/13/2020 4:46 PM CDT by Meena Pratt MA               Rapid Strep POC   Rapid Strep POC :   Negative   POC Test Comments :   S/O Confirmation   Meena Pratt MA - 3/13/2020 4:46 PM CDT   Details   Collection Date :   3/13/2020 4:45 PM CDT   Handling Specimen POC :   Throat   POC Test Comments :   Lab Test Preformed by:   [facility] Office  [address] [place] [id number]  Phone: [phone]  Fax: [phone]     Meena Pratt MA - 3/13/2020 4:46 PM CDT

## 2022-02-15 NOTE — TELEPHONE ENCOUNTER
---------------------  From: Sergio/Shana SANCHEZ (Phone Messages Pool ([id number]))   To: BAM Message Pool ([id number]);     Sent: 9/13/2021 2:03:34 PM CDT  Subject: FW: Fmla papers           ---------------------  From: CAMRYN STEIN  To: [facility]  Sent: 09/13/2021 01:58 p.m. CDT  Subject: Fmla papers  I called and left message for Dr. Marcelina Mathur. [facility] exc[facility] me from work. I received a message from them stating they are due back by Sept.20th if there are any questions please call me [phone]  Thank you---------------------  From: Elgin Dockery CMA ([employer] Message Pool ([id number]))   To: Roula Callahan PA-C;     Sent: 9/15/2021 2:36:40 PM CDT  Subject: FW: Fmla papers---------------------  From: Roula Callahan PA-C   To: BAM Message Pool ([id number]);     Sent: 9/15/2021 2:55:15 PM CDT  Subject: RE: Fmla papers     They are in my outbox completed I believe, if not there [place] may have picked them Roula Kulkarni completed your FMLA paperwork and it was faxed this morning to [place] [employer] f-[phone].  Confirmation received.  Please let us know if there is anything else we can do for you.  Have a nice day. Elgin Dockery CMA---------------------  From: Elgin Dockery CMA (BAM Message Pool ([id number]))   To: CAMRYN STEIN    Sent: 9/16/2021 12:31:14 PM CDT  Subject: FW: Fmla papers

## 2022-02-15 NOTE — PROGRESS NOTES
Patient:   CAMRYN STEIN            MRN: [id number]            FIN: [id number]               Age:   42 years     Sex:  Female     :  1975   Associated Diagnoses:   Left maxillary sinusitis; Seasonal Allergies   Author:   Sergei Correa PA-C      Visit Information      Primary Care Provider (PCP):  Sergei Correa PA-C    NPI# [id number]   Visit type:  New symptom.    Source of history:  Self.    Referral source:  Self.    History limitation:  None.       Chief Complaint   2017 3:27 PM CDT    Left facial pressure, teeth pain,cough x 2 days;        History of Present Illness             The patient presents with sinus problem.  The sinus problem is located in the maxillary sinus.  The sinus problem is characterized by nasal congestion, rhinorrhea, headache and facial pain.  The severity of the sinus problem is moderate.  The sinus problem is episodic, fluctuates in intensity and is worsening.  Perennial allergic rhinitis. Would like to try different medications. Sick for one month. Now worsening cough and left maxillary pain. CC above noted and confirmed with the patient. Low grade fever..        Review of Systems   Constitutional:  Negative except as documented in history of present illness.    Eye:  Negative.    Ear/Nose/Mouth/Throat:  Negative except as documented in history of present illness.    Respiratory:  Negative except as documented in history of present illness.       Health Status   Allergies:    Allergic Reactions (All)  Severity Not Documented  Amoxicillin (No reactions were documented)  Doxycycline (Rash, itchy)  Penicillin (No reactions were documented)  Sulfa drug (No reactions were documented)  Canceled/Inactive Reactions (All)  No known allergies   Medications:  (Selected)   Prescriptions  Prescribed  FLUoxetine 20 mg oral tablet: 1 tab(s) ( 20 mg ), PO, Daily, # 90 tab(s), 3 Refill(s), Type: Maintenance, Pharmacy: [facility] #[id number], 1 tab(s) po daily  Nasonex 50 mcg/inh nasal spray:  2 spray(s), nasal, daily, PRN: for allergy symptoms, # 1 EA, 2 Refill(s), Type: Maintenance, Pharmacy: [facility] #[id number], 2 spray(s) nasal daily,PRN:for allergy symptoms  Singulair 10 mg oral tablet: 1 tab(s) ( 10 mg ), PO, qPM, # 30 tab(s), 2 Refill(s), Type: Maintenance, Pharmacy: [facility] #[id number], 1 tab(s) po qpm  Xyzal 5 mg oral tablet: 1 tab(s) ( 5 mg ), po, qpm, # 30 tab(s), 2 Refill(s), Type: Maintenance, Pharmacy: [facility] #[id number], 1 tab(s) po qpm  Zithromax 250 mg oral tablet: 1 packet(s), PO, Once, Instructions: as directed on package labeling. Two tablets po on day one, then one tablet daily x four days, # 6 tab(s), 0 Refill(s), Type: Soft Stop, Pharmacy: [facility] #[id number], 1 packet(s) po once,Instr:as directed on pac...  meclizine 25 mg oral tablet: 1 tab(s) ( 25 mg ), PO, TID, PRN: for dizziness, # 30 tab(s), 0 Refill(s), Type: Maintenance, Pharmacy: [facility] #[id number], 1 tab(s) po tid,PRN:for dizziness  omeprazole 20 mg oral delayed release capsule: 1 cap(s) ( 20 mg ), PO, Daily, # 90 cap(s), 3 Refill(s), Type: Maintenance, Pharmacy: [facility] #[id number], 1 cap(s) po daily   Problem list:    All Problems  ASCUS with positive high risk HPV / ICD-9-.15 / Confirmed  Depression, Recurrent / ICD-9-.30 / Confirmed  Family History of Thyroid Disease / ICD-9-CM V18.19 / Confirmed  LGSIL on Pap Smear / ICD-9-.03 / Confirmed  Tobacco user / ICD-9-.1 / Confirmed  Resolved: Asthma / ICD-9-.90  Resolved: Chicken Pox / ICD-9-.9  Resolved: Pregnancy / SNOMED CT 780865523  Resolved: Seasonal Allergies / ICD-9-.9  Canceled: LGSIL (Low Grade Squamous Intraepithelial Dysplasia) / ICD-9-.03      Histories   Past Medical History:    Active  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  Comments:  3/18/2013 CDT 12:14 PM CDT - Marlene WILSON, [facility]  Colposcopy neg. OK to re-pap one year.  LGSIL on Pap Smear (795.03): Onset  in 2004 at 28 years.  Depression, Recurrent (296.30)  Tobacco user (305.1)  Family History of Thyroid Disease (V18.19)  Resolved  Chicken Pox (052.9): Onset in 1983 at 7 years.  Resolved.  Asthma (493.90):  Resolved.  Seasonal Allergies (477.9):  Resolved.  Pregnancy (849874684):  Resolved in 1995 at 19 years.   Family History:    Diabetes mellitus  Mother  Hypertension  Father  Depression  Mother  Aunt     Procedure history:    Vaginal hysterectomy (604702507) on 2/17/2017 at 42 Years.  LEEP ([id number]) on 5/10/2004 at 29 Years.  bunionectomy with osteotomy base of left foot in 2004 at 29 Years.  Colposcopy (2904758424).  Comments:  10/19/2012 1:18 PM - Carleen Washington  '93, '95, '04   Social History:        Alcohol Assessment: Denies Alcohol Use                     Comments:                      02/22/2017 - Yissel Gallagher LPN                     Denies alcohol use      Tobacco Assessment: Current                     Comments:                      02/14/2013 - Staci Lisa CMA                     5 per day      Substance Abuse Assessment: Denies Substance Abuse      Exercise and Physical Activity Assessment: Does not exercise            Exercise frequency: Never.      Sexual Assessment            Sexually active: Yes.        Physical Examination   Vital Signs   6/21/2017 3:27 PM CDT Temperature Tympanic 99.1 DegF    Peripheral Pulse Rate 88 bpm    Pulse Site Radial artery    HR Method Manual    Systolic Blood Pressure 102 mmHg    Diastolic Blood Pressure 68 mmHg    Mean Arterial Pressure 79 mmHg    BP Site Right arm    BP Method Manual      Measurements from flowsheet : Measurements   6/21/2017 3:27 PM CDT    Weight Measured - Standard                143.2 lb     General:  Alert and oriented, No acute distress.    Eye:  Pupils are equal, round and reactive to light, Extraocular movements are intact, Normal conjunctiva.    HENT:  Normocephalic, Tympanic membranes are clear, Oral mucosa is moist.         Nose:  Left nostril, Discharge ( Moderate amount, Green ).         Sinus: Left, Maxillary sinus, Tenderness.    Neck:  Supple, Non-tender, No lymphadenopathy.    Respiratory:  Lungs are clear to auscultation, Respirations are non-labored.    Cardiovascular:  Normal rate, Regular rhythm, No murmur.       Impression and Plan   Diagnosis     Left maxillary sinusitis (DSQ27-NA J32.0).     Seasonal Allergies (QQF06-QI J30.2).     Patient Instructions:       Counseled: Patient, Regarding diagnosis, Regarding medications, Activity, Verbalized understanding.    Orders     Orders (Selected)   Prescriptions  Prescribed  Nasonex 50 mcg/inh nasal spray: 2 spray(s), nasal, daily, PRN: for allergy symptoms, # 1 EA, 2 Refill(s), Type: Maintenance, Pharmacy: [facility] #[id number], 2 spray(s) nasal daily,PRN:for allergy symptoms  Singulair 10 mg oral tablet: 1 tab(s) ( 10 mg ), PO, qPM, # 30 tab(s), 2 Refill(s), Type: Maintenance, Pharmacy: [facility] #[id number], 1 tab(s) po qpm  Xyzal 5 mg oral tablet: 1 tab(s) ( 5 mg ), po, qpm, # 30 tab(s), 2 Refill(s), Type: Maintenance, Pharmacy: [facility] #[id number], 1 tab(s) po qpm  Zithromax 250 mg oral tablet: 1 packet(s), PO, Once, Instructions: as directed on package labeling. Two tablets po on day one, then one tablet daily x four days, # 6 tab(s), 0 Refill(s), Type: Soft Stop, Pharmacy: [facility] #[id number], 1 packet(s) po once,Instr:as directed on pac....     Take medicine as prescribed, side effects discussed.  Tylenol/ibuprofen for fever and discomfort.  Push fluids.  RTC if not improving in 36-48 hours, prior if concerns as we have discussed.

## 2022-02-15 NOTE — PROGRESS NOTES
Patient:   CAMRYN STEIN            MRN: [id number]            FIN: [id number]               Age:   46 years     Sex:  Female     :  1975   Associated Diagnoses:   Acute sinusitis; Seasonal Allergies; Otitis media, left   Author:   Orin Carlson      Visit Information      Date of Service: 2021 08:40 am  Performing Location: [facility]  Encounter#: [id number]      Primary Care Provider (PCP):  Sergei Correa PA-C    NPI# [id number]      Referring Provider:  Orin Carlson    NPI# [id number]      Chief Complaint   2021 8:50 AM CDT     Patient is here today c/o plugged ears since last thursday. Patient was seen on  at  and was told to f/u if not better      History of Present Illness   Here in f/u, was seen 3 days ago for URI symptoms, was told it was viral. She believes she now has sinus infection, she gets those twice a year  needs refill on the allergy meds she uses, she also tried a neti pot but didn't work   No fever but great nasal congestion and right ear  pain now for 3 days  SHe has had covid vaccine and she tested negative for COVID infection 3days ago  She denies being a smoker      Health Status   Allergies:    Allergic Reactions (Selected)  Severity Not Documented  Amoxicillin (No reactions were documented)  Doxycycline (Rash, itchy)  Penicillin (No reactions were documented)  Sulfa drug (No reactions were documented)   Medications:  (Selected)   Prescriptions  Prescribed  cefuroxime 500 mg oral tablet: = 1 tab(s) ( 500 mg ), PO, BID, # 20 tab(s), 0 Refill(s), Type: Maintenance, Pharmacy: [facility], 1 tab(s) Oral bid,x10 day(s), 60, in, 21 8:50:00 CDT, Height Measured, 155.2, lb, 090...  fluticasone 50 mcg/inh nasal spray: See Instructions, Instructions: 1 spray(s)  in each nostril daily, # 16 gm, 8 Refill(s), Type: Soft Stop, Pharmacy: [facility], 1 spray(s); in each nostril daily, 60, in, 09/01/21 8:50:00 CDT...  levocetirizine 5 mg oral tablet: = 1 tab(s) ( 5 mg ), Oral, qpm, # 90 tab(s), 3 Refill(s), Type: Soft Stop, Pharmacy: [facility], 1 tab(s) Oral qpm, 60, in, 09/01/21 8:50:00 CDT, Height Measured, 155.2, lb, 09/01/21 8:50:00...  Documented Medications  Documented  Protonix 20 mg oral delayed release tablet: = 2 tab(s) ( 40 mg ), Oral, daily, # 60 tab(s), 0 Refill(s), Type: Maintenance,    Medications          *denotes recorded medication          cefuroxime 500 mg oral tablet: 500 mg, 1 tab(s), PO, BID, for 10 day(s), 20 tab(s), 0 Refill(s).          fluticasone 50 mcg/inh nasal spray: See Instructions, 1 spray(s)  in each nostril daily, 16 gm, 8 Refill(s).          levocetirizine 5 mg oral tablet: 5 mg, 1 tab(s), Oral, qpm, 90 tab(s), 3 Refill(s).          *Protonix 20 mg oral delayed release tablet: 40 mg, 2 tab(s), Oral, daily, 60 tab(s), 0 Refill(s).       Problem list:    All Problems  Tobacco user / ICD-9-.1 / Confirmed  Obesity / SNOMED CT 0853271430 / Probable  LGSIL on Pap Smear / ICD-9-.03 / Confirmed  Family History of Thyroid Disease / ICD-9-CM V18.19 / Confirmed  Depression, Recurrent / ICD-9-.30 / Confirmed  ASCUS with positive high risk HPV / ICD-9-.15 / Confirmed  Colposcopy neg. OK to re-pap one year.  Resolved: Seasonal Allergies / ICD-9-.9  Resolved: Pregnancy / SNOMED CT 131899569  Resolved: Chicken Pox / ICD-9-.9  Resolved: Asthma / ICD-9-.90  Canceled: LGSIL (Low Grade Squamous Intraepithelial Dysplasia) / ICD-9-.03      Histories   Past Medical History:    Active  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  Comments:  3/18/2013 CDT 12:14 PM CDT - Marlene WILSON, [facility]  Colposcopy neg. OK to re-pap one year.  LGSIL on Pap Smear (795.03): Onset in 2004 at 28  years.  Depression, Recurrent (296.30)  Tobacco user (305.1)  Family History of Thyroid Disease (V18.19)  Resolved  Chicken Pox (052.9): Onset in 1983 at 7 years.  Resolved.  Asthma (493.90):  Resolved.  Seasonal Allergies (477.9):  Resolved.  Pregnancy (496465769):  Resolved in 1995 at 19 years.   Family History:    Diabetes mellitus  Mother  Hypertension  Father  Depression  Mother  Aunt     Procedure history:    Vaginal hysterectomy (SNOMED CT 696897019) performed by Kapil Ge MD on 2/17/2017 at 42 Years.  LEEP (SNOMED CT 42277065) on 5/10/2004 at 29 Years.  bunionectomy with osteotomy base of left foot in 2004 at 29 Years.  Colposcopy (SNOMED CT 5557165789).  Comments:  10/19/2012 1:18 PM CDT - Carleen Washington  '93, '95, '04   Social History:        Electronic Cigarette/Vaping Assessment            Electronic Cigarette Use: Never.      Alcohol Assessment: Denies Alcohol Use                     Comments:                      02/22/2017 - Yissel Gallagher LPN                     Denies alcohol use      Tobacco Assessment: Current            5-9 cigarettes (between 1/4 to 1/2 pack)/day in last 30 days                     Comments:                      02/14/2013 - Staci Lisa CMA                     5 per day      Substance Abuse Assessment: Denies Substance Abuse      Employment and Education Assessment            Work/School description: .      Home and Environment Assessment            Marital status: .  Spouse/Partner name: Ramesh.      Exercise and Physical Activity Assessment: Does not exercise            Exercise frequency: Never.      Sexual Assessment            Sexually active: Yes.      Other Assessment            First menses age 12.  Regular menses.  Menstrual duration 3-5 days.  Cycle interval 28-30 days.  History of               abnormal Pap smear.        Physical Examination   VS/Measurements   Eye:  Normal conjunctiva.    HENT:  No pharyngeal erythema, nasal mucosa  swollenclosed on right  RIght TM bulding and slight erythema, yellow fluid behind TM.    Neck:  Supple, Non-tender, No lymphadenopathy.    Respiratory:  Lungs are clear to auscultation, Breath sounds are equal.    Cardiovascular:  Normal rate, Regular rhythm, No murmur.       Impression and Plan   Diagnosis     Acute sinusitis (JDG79-XK J01.90).     Seasonal Allergies (HDU09-WB J30.2).     Otitis media, left (BGE64-IQ H66.92).     Patient Instructions:       Counseled: Patient, Regarding diagnosis, Regarding treatment, Regarding medications, Verbalized understanding, Counseled on symptomatic management. Return to clinic for re evaluation if worsening, simply not improving, or failure to resolve.   .    Orders     Orders (Selected)   Prescriptions  Prescribed  cefuroxime 500 mg oral tablet: = 1 tab(s) ( 500 mg ), PO, BID, # 20 tab(s), 0 Refill(s), Type: Maintenance, Pharmacy: [facility], 1 tab(s) Oral bid,x10 day(s), 60, in, 09/01/21 8:50:00 CDT, Height Measured, 155.2, lb, 09/0...  fluticasone 50 mcg/inh nasal spray: See Instructions, Instructions: 1 spray(s)  in each nostril daily, # 16 gm, 8 Refill(s), Type: Soft Stop, Pharmacy: [facility], 1 spray(s); in each nostril daily, 60, in, 09/01/21 8:50:00 CDT...  levocetirizine 5 mg oral tablet: = 1 tab(s) ( 5 mg ), Oral, qpm, # 90 tab(s), 3 Refill(s), Type: Soft Stop, Pharmacy: [facility], 1 tab(s) Oral qpm, 60, in, 09/01/21 8:50:00 CDT, Height Measured, 155.2, lb, 09/01/21 8:50:00....

## 2022-02-15 NOTE — TELEPHONE ENCOUNTER
---------------------  From: Senait Melara CMA (Phone Messages [employer] ([id number])   To: Sergei Correa PA-C;     Sent: 3/24/2020 3:28:33 PM CDT  Subject: phone note- orders     Phone Message    PCP:    KAH    Time of Call:  3:09 pm       Person Calling:  Liberty   Phone number:  [phone]    Returned call at: _    Note:  Patient called for orders for the breast U/S and tomosynthesis. Please order.---------------------  From: Sergei Correa PA-C   To: Phone "[facility] ([id number]);     Sent: 3/25/2020 7:58:09 AM CDT  Subject: RE: phone note- orders     Order in chart. Please fax as appropriate.    [facility]AHPrint[facility] 8:21 am  Note: Called and notified patient.

## 2022-02-15 NOTE — NURSING NOTE
Comprehensive Intake Entered On:  9/1/2021 8:54 AM CDT    Performed On:  9/1/2021 8:50 AM CDT by Shana Fernandez               Summary   Chief Complaint :   Patient is here today c/o plugged ears since last thursday. Patient was seen on Sunday at  and was told to f/u if not better    Menstrual Status :   Menarcheal   Weight Measured :   155.2 lb(Converted to: 155 lb 3 oz, 70.398 kg)    Height Measured :   60 in(Converted to: 5 ft 0 in, 152.40 cm)    Body Mass Index :   30.31 kg/m2 (HI)    Body Surface Area :   1.72 m2   Systolic Blood Pressure :   111 mmHg   Diastolic Blood Pressure :   76 mmHg   Mean Arterial Pressure :   88 mmHg   Peripheral Pulse Rate :   81 bpm   BP Site :   Right arm   BP Method :   Electronic   HR Method :   Electronic   Temperature Tympanic :   98.1 DegF(Converted to: 36.7 DegC)    Race :      Languages :   English   Ethnicity :   Not  or    Shana Fernandez - 9/1/2021 8:50 AM CDT   Health Status   Allergies Verified? :   Yes   Medication History Verified? :   Yes   Immunizations Current :   Yes   Medical History Verified? :   Yes   Pre-Visit Planning Status :   Completed   Tobacco Use? :   Former smoker   Shana Fernandez - 9/1/2021 8:50 AM CDT   Consents   Consent for Immunization Exchange :   Consent Granted   Consent for Immunizations to Providers :   Consent Granted   Shana Fernandez - 9/1/2021 8:50 AM CDT   Meds / Allergies   (As Of: 9/1/2021 8:54:55 AM CDT)   Allergies (Active)   amoxicillin  Estimated Onset Date:   Unspecified ; Created By:   Mili Olivera CMA; Reaction Status:   Active ; Substance:   amoxicillin ; Updated By:   Mili Olivera CMA; Reviewed Date:   9/1/2021 8:53 AM CDT      doxycycline  Estimated Onset Date:   Unspecified ; Reactions:   rash, itchy ; Created By:   Carleen Washington; Reaction Status:   Active ; Category:   Drug ; Substance:   doxycycline ; Type:   Allergy ; Updated By:   Carleen Washington; Reviewed Date:   9/1/2021 8:53 AM CDT       penicillin  Estimated Onset Date:   Unspecified ; Created By:   Mili Olivera CMA; Reaction Status:   Active ; Category:   Drug ; Substance:   penicillin ; Type:   Allergy ; Updated By:   Mili Olivera CMA; Reviewed Date:   9/1/2021 8:53 AM CDT      sulfa drug  Estimated Onset Date:   Unspecified ; Created By:   Mili Olivera CMA; Reaction Status:   Active ; Substance:   sulfa drug ; Updated By:   Mili Olivera CMA; Reviewed Date:   9/1/2021 8:53 AM CDT        Medication List   (As Of: 9/1/2021 8:54:55 AM CDT)   Prescription/Discharge Order    levocetirizine 5 mg oral tablet  :   levocetirizine 5 mg oral tablet ; Status:   Prescribed ; Ordered As Mnemonic:   levocetirizine 5 mg oral tablet ; Simple Display Line:   See Instructions, ***********PT REQUESTING NEW RX***********, 1 tab(s) ; Ordering Provider:   Sergei Correa PA-C; Catalog Code:   levocetirizine ; Order Dt/Tm:   4/13/2020 9:30:46 AM CDT          fluticasone 50 mcg/inh nasal spray  :   fluticasone 50 mcg/inh nasal spray ; Status:   Prescribed ; Ordered As Mnemonic:   fluticasone 50 mcg/inh nasal spray ; Simple Display Line:   See Instructions, ****PT NEEDS NEW RX***************, 1 gm ; Ordering Provider:   Sergei Correa PA-C; Catalog Code:   fluticasone nasal ; Order Dt/Tm:   4/13/2020 9:30:31 AM CDT            Home Meds    pantoprazole  :   pantoprazole ; Status:   Documented ; Ordered As Mnemonic:   Protonix 20 mg oral delayed release tablet ; Simple Display Line:   40 mg, 2 tab(s), Oral, daily, 60 tab(s), 0 Refill(s) ; Catalog Code:   pantoprazole ; Order Dt/Tm:   8/29/2021 8:37:40 AM CDT

## 2022-02-15 NOTE — NURSING NOTE
Comprehensive Intake Entered On:  10/9/2019 4:42 PM CDT    Performed On:  10/9/2019 4:42 PM CDT by Jocelin Carnes CMA               Summary   Chief Complaint :   BP Check    Menstrual Status :   Menarcheal   Systolic Blood Pressure :   102 mmHg   Diastolic Blood Pressure :   60 mmHg   Mean Arterial Pressure :   74 mmHg   Peripheral Pulse Rate :   73 bpm   Oxygen Saturation :   98 %   Race :      Languages :   English   Ethnicity :   Not  or    Jocelin Carnes CMA - 10/9/2019 4:42 PM CDT

## 2022-02-15 NOTE — NURSING NOTE
Comprehensive Intake Entered On:  3/25/2021 9:40 AM CDT    Performed On:  3/25/2021 9:31 AM CDT by Madison Eastman               Summary   Chief Complaint :   Pt c/o runny/stuffy nose, sinus pressure, dry cough unless she takes a shower she then coughs up white to green phlegm. She states she gets sinus infections or bronchitis every year. telephone visit   Menstrual Status :   Menarcheal   Race :      Languages :   English   Ethnicity :   Not  or    Madison Eastman - 3/25/2021 9:31 AM CDT   Health Status   Allergies Verified? :   Yes   Medication History Verified? :   Yes   Immunizations Current :   Yes   Tobacco Use? :   Former smoker   Madison Eastman - 3/25/2021 9:31 AM CDT   Consents   Consent for Immunization Exchange :   Consent Granted   Consent for Immunizations to Providers :   Consent Granted   Madison Eastman - 3/25/2021 9:31 AM CDT   Meds / Allergies   (As Of: 3/25/2021 9:40:35 AM CDT)   Allergies (Active)   amoxicillin  Estimated Onset Date:   Unspecified ; Created By:   Mili Olivera; Reaction Status:   Active ; Substance:   amoxicillin ; Updated By:   Mili Olivera; Reviewed Date:   3/25/2021 9:35 AM CDT      doxycycline  Estimated Onset Date:   Unspecified ; Reactions:   rash, itchy ; Created By:   Carleen Washington; Reaction Status:   Active ; Category:   Drug ; Substance:   doxycycline ; Type:   Allergy ; Updated By:   Carleen Washington; Reviewed Date:   3/25/2021 9:35 AM CDT      penicillin  Estimated Onset Date:   Unspecified ; Created By:   Mili Olivera; Reaction Status:   Active ; Category:   Drug ; Substance:   penicillin ; Type:   Allergy ; Updated By:   Mili Olivera; Reviewed Date:   3/25/2021 9:35 AM CDT      sulfa drug  Estimated Onset Date:   Unspecified ; Created By:   Mili Olivera; Reaction Status:   Active ; Substance:   sulfa drug ; Updated By:   Mili Olivera; Reviewed Date:   3/25/2021 9:35 AM CDT        Medication List   (As Of: 3/25/2021 9:40:35 AM CDT)    Prescription/Discharge Order    azithromycin  :   azithromycin ; Status:   Prescribed ; Ordered As Mnemonic:   Zithromax Z-Sohail 250 mg oral tablet ; Simple Display Line:   Take 2 tablets on Day 1 and then 1 tablet, Oral, daily, until finished, 6 tab(s), 0 Refill(s) ; Ordering Provider:   Radu Cardoso PA-C; Catalog Code:   azithromycin ; Order Dt/Tm:   2/1/2021 1:08:31 PM CST          levocetirizine 5 mg oral tablet  :   levocetirizine 5 mg oral tablet ; Status:   Prescribed ; Ordered As Mnemonic:   levocetirizine 5 mg oral tablet ; Simple Display Line:   See Instructions, ***********PT REQUESTING NEW RX***********, 1 tab(s) ; Ordering Provider:   Sergei Correa PA-C; Catalog Code:   levocetirizine ; Order Dt/Tm:   4/13/2020 9:30:46 AM CDT          fluticasone 50 mcg/inh nasal spray  :   fluticasone 50 mcg/inh nasal spray ; Status:   Prescribed ; Ordered As Mnemonic:   fluticasone 50 mcg/inh nasal spray ; Simple Display Line:   See Instructions, ****PT NEEDS NEW RX***************, 1 gm ; Ordering Provider:   Sergei Correa PA-C; Catalog Code:   fluticasone nasal ; Order Dt/Tm:   4/13/2020 9:30:31 AM CDT            ID Risk Screen   Recent Travel History :   No recent travel   Family Member Travel History :   No recent travel   Other Exposure to Infectious Disease :   Unknown   COVID-19 Testing Status :   No positive COVID-19 test   Madison Eastman - 3/25/2021 9:31 AM CDT   Social History   Social History   (As Of: 3/25/2021 9:40:35 AM CDT)   Alcohol:  Denies Alcohol Use       Comments:  2/22/2017 10:46 AM - Yissel Gallagher LPN: Denies alcohol use   (Last Updated: 2/22/2017 10:46:53 AM CST by Yissel Gallagher LPN)          Tobacco:  Current      5-9 cigarettes (between 1/4 to 1/2 pack)/day in last 30 days   Comments:  2/14/2013 2:49 PM - Staci Lisa CMA: 5 per day   (Last Updated: 2/1/2021 12:29:20 PM CST by Elgin Dockery CMA)          Electronic Cigarette/Vaping:        Electronic Cigarette Use: Never.   (Last  Updated: 2/1/2021 12:29:24 PM CST by Holley Dockery CMA          Substance Abuse:  Denies Substance Abuse      (Last Updated: 1/31/2011 9:37:25 AM CST by Mary Ellen Matthews CMA )         Employment/School:        Work/School description: .   (Last Updated: 10/10/2017 1:12:51 PM CDT by Tatianna Caruso)          Home/Environment:        Marital status: .  Spouse/Partner name: Ramesh.   (Last Updated: 10/10/2017 1:11:59 PM CDT by Tatianna Caruso)          Exercise:  Does not exercise      Exercise frequency: Never.   (Last Updated: 2/14/2013 2:49:48 PM CST by Staci Lisa CMA)          Sexual:        Sexually active: Yes.   (Last Updated: 2/14/2013 2:49:48 PM CST by Staci Lisa CMA)          Other:        First menses age 12.  Regular menses.  Menstrual duration 3-5 days.  Cycle interval 28-30 days.  History of abnormal Pap smear.   (Last Updated: 10/10/2017 1:12:34 PM CDT by Tatianna Caruso)

## 2022-02-15 NOTE — PROGRESS NOTES
Patient:   CAMRYN STEIN            MRN: [id number]            FIN: [id number]               Age:   45 years     Sex:  Female     :  1975   Associated Diagnoses:   Sore throat   Author:   Rogelio Natarajan MD      Chief Complaint   3/13/2020 3:55 PM CDT    Sore throat, chills x 2 days        History of Present Illness             The patient presents with a sore throat.  The sore throat is described as burning, scratchy and aching.  The severity of the sore throat is moderate.  The timing/course of the sore throat is constant.  The sore throat has lasted for 2 day(s).  Exacerbating factors consist of none.  Relieving factors consist of analgesics and fluids.  Associated symptoms consist of denies cough, denies difficulty swallowing, denies fever and denies headache.  Additional pertinent history: was at work earlier this week with two co-workers who are now self quarantining due to possible exposure to COVID-19.  Co-workers were asymptomatic..        Review of Systems   Ear/Nose/Mouth/Throat:  Sore throat.       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 / ICD-9-.15 / Confirmed  Depression, Recurrent / ICD-9-.30 / Confirmed  Family History of Thyroid Disease / ICD-9-CM V18.19 / Confirmed  LGSIL on Pap Smear / ICD-9-.03 / Confirmed  Tobacco user / ICD-9-.1 / Confirmed  Resolved: Asthma / ICD-9-.90  Resolved: Chicken Pox / ICD-9-.9  Resolved: Pregnancy / SNOMED CT 746985005  Resolved: Seasonal Allergies / ICD-9-.9  Canceled: LGSIL (Low Grade Squamous Intraepithelial Dysplasia) / ICD-9-.03   Medications:  (Selected)         Histories   Past Medical History:    Active  ASCUS with positive high risk  HPV (ICD-9-.15): Onset on 2/14/2013 at 38 years.  Comments:  3/18/2013 CDT 12:14 PM CDT - Nubia Chang  Colposcopy neg. OK to re-pap one year.  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  Depression, Recurrent (ICD-9-.30)  Tobacco user (ICD-9-.1)  Family History of Thyroid Disease (ICD-9-CM V18.19)  Resolved  Chicken Pox (ICD-9-.9): Onset in 1983 at 7 years.  Resolved.  Asthma (ICD-9-.90):  Resolved.  Seasonal Allergies (ICD-9-.9):  Resolved.  Pregnancy (SNOMED CT 774270084):  Resolved in 1995 at 19 years.   Family History:    Diabetes mellitus  Mother  Hypertension  Father  Depression  Mother  Aunt     Procedure history:    Vaginal hysterectomy (SNOMED CT 431610270) performed by Kapil Ge MD on 2/17/2017 at 42 Years.  LEEP (SNOMED CT 67136917) on 5/10/2004 at 29 Years.  bunionectomy with osteotomy base of left foot in 2004 at 29 Years.  Colposcopy (SNOMED CT 1074609315).  Comments:  10/19/2012 1:18 PM CDT - Carleen Washington  '93, '95, '04   Social History:        Alcohol Assessment: Denies Alcohol Use                     Comments:                      02/22/2017 - Yissel Gallagher LPN                     Denies alcohol use      Tobacco Assessment: Current                     Comments:                      02/14/2013 - Staci Lisa CMA                     5 per day      Substance Abuse Assessment: Denies Substance Abuse      Employment and Education Assessment            Work/School description: .      Home and Environment Assessment            Marital status: .  Spouse/Partner name: Ramesh.      Exercise and Physical Activity Assessment: Does not exercise            Exercise frequency: Never.      Sexual Assessment            Sexually active: Yes.      Other Assessment            First menses age 12.  Regular menses.  Menstrual duration 3-5 days.  Cycle interval 28-30 days.  History of               abnormal Pap smear.         Physical Examination   Vital Signs   3/13/2020 3:55 PM CDT Temperature Tympanic 98.6 DegF    Peripheral Pulse Rate 72 bpm    Pulse Site Radial artery    HR Method Manual    Systolic Blood Pressure 112 mmHg    Diastolic Blood Pressure 60 mmHg    Mean Arterial Pressure 77 mmHg    BP Site Left arm    BP Method Manual      Measurements from flowsheet : Measurements   3/13/2020 3:55 PM CDT Height Measured - Standard 60 in    Weight Measured - Standard 143 lb    BSA 1.66 m2    Body Mass Index 27.92 kg/m2  HI      General:  Alert and oriented, No acute distress.    Eye:  Normal conjunctiva.    HENT:  Normocephalic, Tympanic membranes are clear, Oral mucosa is moist, Beefy red posterior pharynx,.    Neck:  Supple, Non-tender, No lymphadenopathy, No thyromegaly, Benign reactive lymphadenopathy.    Respiratory:  Lungs are clear to auscultation, Breath sounds are equal, Symmetrical chest wall expansion.         Respirations: Are within normal limits.         Pattern: Regular.         Breath sounds: Bilateral, Within normal limits.    Cardiovascular:  Normal rate, Regular rhythm, No murmur, Good pulses equal in all extremities, Normal peripheral perfusion, No edema.    Gastrointestinal:  Soft, Non-tender, Non-distended, Normal bowel sounds, No organomegaly.    Integumentary:  Warm, Dry, No rash.    Neurologic:  Alert, Oriented.    Psychiatric:  Cooperative.       Review / Management   Results review:  Lab results: 3/13/2020 4:46 PM CDT    Rapid Strep POC           Negative  .       Impression and Plan   Diagnosis     Sore throat (DQL47-AH J02.9).     Patient Instructions:       Counseled: Patient, Regarding diagnosis, Regarding medications, Activity, Verbalized understanding.    Orders     Advised to utilize [facility] to look into if she needs to be screened for COVID-19.  She has used this before and therefore understands how it works..

## 2022-02-15 NOTE — NURSING NOTE
Comprehensive Intake Entered On:  8/29/2021 8:39 AM CDT    Performed On:  8/29/2021 8:34 AM CDT by Senait Goodwin LPN               Summary   Chief Complaint :   right ear pain started this morning, sore throat two days ago, swollen glands, nasal congestion.    Menstrual Status :   Menarcheal   Weight Measured :   155 lb(Converted to: 155 lb 0 oz, 70.307 kg)    Height Measured :   60 in(Converted to: 5 ft 0 in, 152.40 cm)    Body Mass Index :   30.27 kg/m2 (HI)    Body Surface Area :   1.72 m2   Systolic Blood Pressure :   118 mmHg   Diastolic Blood Pressure :   64 mmHg   Mean Arterial Pressure :   82 mmHg   Peripheral Pulse Rate :   82 bpm   BP Site :   Right arm   Pulse Site :   Radial artery   BP Method :   Manual   HR Method :   Manual   Temperature Tympanic :   97.9 DegF(Converted to: 36.6 DegC)    Respiratory Rate :   18 br/min   Oxygen Saturation :   98 %   Race :      Languages :   English   Ethnicity :   Not  or    Senait Goodwin LPN - 8/29/2021 8:34 AM CDT   Health Status   Allergies Verified? :   Yes   Medication History Verified? :   Yes   Immunizations Current :   Yes   Pre-Visit Planning Status :   Completed   Senait Goodwin LPN - 8/29/2021 8:34 AM CDT   Consents   Consent for Immunization Exchange :   Consent Granted   Consent for Immunizations to Providers :   Consent Granted   Senait Goodwin LPN - 8/29/2021 8:34 AM CDT   Meds / Allergies   (As Of: 8/29/2021 8:39:33 AM CDT)   Allergies (Active)   amoxicillin  Estimated Onset Date:   Unspecified ; Created By:   Mili Olivera CMA; Reaction Status:   Active ; Substance:   amoxicillin ; Updated By:   Mili Olivera CMA; Reviewed Date:   8/29/2021 8:37 AM CDT      doxycycline  Estimated Onset Date:   Unspecified ; Reactions:   rash, itchy ; Created By:   Carleen Washington; Reaction Status:   Active ; Category:   Drug ; Substance:   doxycycline ; Type:   Allergy ; Updated By:   Carleen Washington; Reviewed Date:   8/29/2021 8:37  AM CDT      penicillin  Estimated Onset Date:   Unspecified ; Created By:   Mili Olivera CMA; Reaction Status:   Active ; Category:   Drug ; Substance:   penicillin ; Type:   Allergy ; Updated By:   Mili Olivera CMA; Reviewed Date:   8/29/2021 8:37 AM CDT      sulfa drug  Estimated Onset Date:   Unspecified ; Created By:   Mili Olivera CMA; Reaction Status:   Active ; Substance:   sulfa drug ; Updated By:   Mili Olivera CMA; Reviewed Date:   8/29/2021 8:37 AM CDT        Medication List   (As Of: 8/29/2021 8:39:33 AM CDT)   Prescription/Discharge Order    azithromycin  :   azithromycin ; Status:   Completed ; Ordered As Mnemonic:   azithromycin 250 mg oral tablet ; Simple Display Line:   1 packet(s), Oral, once, as directed on package labeling, 6 tab(s), 0 Refill(s) ; Ordering Provider:   Claire Moore MD; Catalog Code:   azithromycin ; Order Dt/Tm:   3/25/2021 10:00:05 AM CDT          fluticasone 50 mcg/inh nasal spray  :   fluticasone 50 mcg/inh nasal spray ; Status:   Prescribed ; Ordered As Mnemonic:   fluticasone 50 mcg/inh nasal spray ; Simple Display Line:   See Instructions, ****PT NEEDS NEW RX***************, 1 gm ; Ordering Provider:   Sergei Correa PA-C; Catalog Code:   fluticasone nasal ; Order Dt/Tm:   4/13/2020 9:30:31 AM CDT          levocetirizine 5 mg oral tablet  :   levocetirizine 5 mg oral tablet ; Status:   Prescribed ; Ordered As Mnemonic:   levocetirizine 5 mg oral tablet ; Simple Display Line:   See Instructions, ***********PT REQUESTING NEW RX***********, 1 tab(s) ; Ordering Provider:   Sergei Correa PA-C; Catalog Code:   levocetirizine ; Order Dt/Tm:   4/13/2020 9:30:46 AM CDT            Home Meds    pantoprazole  :   pantoprazole ; Status:   Documented ; Ordered As Mnemonic:   Protonix 20 mg oral delayed release tablet ; Simple Display Line:   40 mg, 2 tab(s), Oral, daily, 60 tab(s), 0 Refill(s) ; Catalog Code:   pantoprazole ; Order Dt/Tm:   8/29/2021 8:37:40 AM CDT

## 2022-02-15 NOTE — PROGRESS NOTES
Seen for COVID testing at [facility] per Radu Cardoso PA-C    O2 Sat = 98%  (Children under 12 do not require O2 sat)    Specimen sent to:  [facility]    PUI form faxed to: [facility].

## 2022-02-15 NOTE — PROGRESS NOTES
Patient:   CAMRYN STEIN            MRN: [id number]            FIN: [id number]               Age:   42 years     Sex:  Female     :  1975   Associated Diagnoses:   Menometrorrhagia; Dysmenorrhea; Preop examination   Author:   Jordana Snowden MD      Chief Complaint   2017 2:49 PM CST    Pre op for hysterectomy with Dr. Ge @ [facility] on 2017.     HPI: Patient with dysmenorrhea and menorrhagia. Periods have been getting heavier and more painful for several years. Not interested in hormonal treatment. Had consult with Dr. Ge.   Scheduled for hysterectomy. Otherwise has been feeling well.         Review of Systems   Constitutional:  Negative.    Ear/Nose/Mouth/Throat:  sneezing.    Respiratory:  Negative.    Cardiovascular:  Negative.    All other systems reviewed and negative      Health Status   Allergies:    Allergic Reactions (All)  Severity Not Documented  Amoxicillin (No reactions were documented)  Doxycycline (Rash, itchy)  Penicillin (No reactions were documented)  Sulfa drug (No reactions were documented)  Canceled/Inactive Reactions (All)  No known allergies   Medications:  (Selected)   Prescriptions  Prescribed  buPROPion 100 mg/12 hours (SR) oral tablet, extended release: 1 tab(s) ( 100 mg ), PO, BID, # 60 tab(s), 1 Refill(s), Type: Maintenance, Pharmacy: [id number], 1 tab(s) po bid  meclizine 25 mg oral tablet: 1 tab(s) ( 25 mg ), PO, TID, PRN: for dizziness, # 30 tab(s), 0 Refill(s), Type: Maintenance, Pharmacy: [id number], 1 tab(s) po tid,PRN:for dizziness  omeprazole 20 mg oral delayed release capsule: 1 cap(s) ( 20 mg ), PO, Daily, # 90 cap(s), 0 Refill(s), Type: Maintenance, Pharmacy: [id number], 1 cap(s) po daily   Problem list:    All Problems (Selected)  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  Comments:  3/18/2013 CDT 12:14 PM CDT - Nubia Chang  Colposcopy neg. OK to re-pap one year.  LGSIL on Pap Smear (ICD-9-.03): Onset in  at 28 years.  Depression, Recurrent (ICD-9-.30)  Tobacco user (ICD-9-.1)  Family History of Thyroid Disease (ICD-9-CM V18.19)  Resolved  Chicken Pox (ICD-9-.9): Onset in  at 7 years.  Resolved.  Asthma (ICD-9-.90):  Resolved.  Seasonal Allergies (ICD-9-.9):  Resolved.  Pregnancy (SNOMED CT 274459962):  Resolved in  at 19 years.   Family History:    Diabetes mellitus  Mother  Hypertension  Father  Depression  Mother  Aunt     Procedure history:    LEEP ([id number]) on 5/10/2004 at 29 Years.  bunionectomy with osteotomy base of left foot in  at 29 Years.   (normal spontaneous vaginal delivery) ([id number]) in  at 20 Years.  Colposcopy ([id number]).  Comments:  10/19/2012 1:18 PM - Carleen Washington  '93, '95, '04   Social History:        Alcohol Assessment: Denies Alcohol Use      Tobacco Assessment: Current                     Comments:                      2013 - Staci Lisa CMA                     5 per day      Substance Abuse Assessment: Denies Substance Abuse      Exercise and Physical Activity Assessment: Does not exercise            Exercise frequency: Never.      Sexual Assessment            Sexually active: Yes.        Physical Examination   Vital Signs   2017 2:49 PM CST Temperature Tympanic 98.6 DegF    Peripheral Pulse Rate 68 bpm    Pulse Site Radial artery    HR Method Manual    Systolic Blood Pressure 100 mmHg    Diastolic Blood Pressure 64 mmHg    Mean Arterial Pressure 76 mmHg    BP Site Right arm    BP Method Manual      Measurements from flowsheet :  Measurements   2/13/2017 2:49 PM CST Height Measured - Standard 60 in    Weight Measured - Standard 148.1 lb    BSA 1.68 m2    Body Mass Index 28.92 kg/m2      General:  Alert and oriented, No acute distress.    Eye:  Pupils are equal, round and reactive to light, Normal conjunctiva.    HENT:  Normocephalic, Tympanic membranes are clear, Normal hearing, No pharyngeal erythema.    Neck:  Supple, Non-tender, No lymphadenopathy, No thyromegaly.    Respiratory:  Lungs are clear to auscultation, Respirations are non-labored, Breath sounds are equal.    Cardiovascular:  Normal rate, Regular rhythm.    Gastrointestinal:  Soft, Non-tender, Non-distended, Normal bowel sounds, No organomegaly.    Musculoskeletal:  Normal range of motion, No deformity.    Integumentary:  Warm, Dry.    Neurologic:  Alert, Oriented.       Impression and Plan   Diagnosis     Menometrorrhagia (VYT95-KT N92.1).     Dysmenorrhea (TLW24-KB N94.6).     Preop examination (UJA12-JH Z01.818).     Condition:  Patient is stable and appropriate to proceed with surgery. Will schedule outpatient lab appt at [facility] for hgb and type and screen..

## 2022-02-15 NOTE — LETTER
(Inserted Image. Unable to display)   June 17, 2021      CAMRYN STEIN  [address] [place] [address]        Dear CAMRYN,      Thank you for selecting [facility] for your healthcare needs.     Our records indicate you are due for the following services:     Your provider has recommended you have the preventative service for a SCREENING MAMMOGRAM.  Please schedule at your earliest convenience.    [facility] are dedicated to providing excellent care that is most cost effective for our patients.  *Please contact your insurance company regarding approved providers*    [facility]:  [phone]   [facility]:    [phone]    [facility]     [phone]   [facility]    [facility]    [phone]  [facility])  [phone]  [facility]  [phone]  [facility]     [phone]       [facility])      [phone]        Powered by [email or web address]    Sincerely,    Sergei Correa PA-C

## 2022-02-15 NOTE — PROGRESS NOTES
Patient:   CAMRYN STEIN            MRN: [id number]            FIN: [id number]               Age:   41 years     Sex:  Female     :  1975   Associated Diagnoses:   Monilial vaginitis; GERD without esophagitis; Menometrorrhagia; Dysmenorrhea; BPPV (benign paroxysmal positional vertigo)   Author:   Jordana Snowden MD      Chief Complaint   2017 2:34 PM CST    c/o lightheaded, discharge, vaginal itching x Monday      History of Present Illness   1. Patient with recurrence of vaginitis symptoms. Itchy. White discharge. Has had recurrent yeast infections. Seem to come around the time of periods.   2. Periods have been getting heavier. Passing clots. Severe cramps with some periods. Strong family history of significant menorrhagia. Not interested in further fertility. Discussed OCP or other hormonal treatment vs consultation to discuss surgical options. Would like to consult with obgyn  3. Patient has had 2-3 days of feeling off balance. Mostly when turning to the right. Has been a little congested.   4. Patient with episode of difficulty swallowing. Has been noticing more heartburn. Then was swallowing part of a sub sandwich and had sensation that it got stuck momentarily low in her chest. Has never has symptoms like that before.      Health Status   Allergies:    Allergic Reactions (All)  Severity Not Documented  Amoxicillin (No reactions were documented)  Doxycycline (Rash, itchy)  Penicillin (No reactions were documented)  Sulfa drug (No reactions were documented)  Canceled/Inactive Reactions (All)  No known allergies   Medications:  (Selected)   Prescriptions  Prescribed  buPROPion 100 mg/12 hours (SR) oral tablet, extended release: 1 tab(s) ( 100 mg ), PO, BID, # 60 tab(s), 1 Refill(s), Type: Maintenance, Pharmacy: [facility] #[id number], 1 tab(s) po bid      Physical Examination   Vital Signs   2017 2:34 PM CST Temperature Tympanic 97.9 DegF    Peripheral Pulse Rate 72 bpm    Pulse Site Radial  artery    HR Method Manual    Systolic Blood Pressure 114 mmHg    Diastolic Blood Pressure 62 mmHg    Mean Arterial Pressure 79 mmHg    BP Site Right arm    BP Method Manual      Measurements from flowsheet : Measurements   1/12/2017 2:34 PM CST Height Measured - Standard 60 in    Weight Measured - Standard 143 lb    BSA 1.66 m2    Body Mass Index 27.92 kg/m2      General:  Alert and oriented, No acute distress.    Eye:  Pupils are equal, round and reactive to light, Normal conjunctiva.    HENT:  Normocephalic, Oral mucosa is moist, No pharyngeal erythema.    Neck:  Supple, Non-tender, No lymphadenopathy.    Respiratory:  Lungs are clear to auscultation.    Cardiovascular:  Normal rate, Regular rhythm.    Genitourinary:  No urethral discharge.         Vulva: Within normal limits.         Vagina: Discharge ( White ), No foreign body, No laceration, No lesions.         Cervix: No lesions.         Uterus: Not tender.       Impression and Plan   Diagnosis     Monilial vaginitis (GAR73-LJ B37.3).     Orders     Orders (Selected)   Prescriptions  Prescribed  fluconazole 150 mg oral tablet: See Instructions, Instructions: 1 tab PO Once, wait 3 days & repeat., # 2 tab(s), 5 Refill(s), Type: Soft Stop, Pharmacy: [facility] #[id number], 1 tab PO Once, wait 3 days & repeat..     Diagnosis     GERD without esophagitis (WEV03-RT K21.9).     Plan:  If not improving or if any further issues with food getting stuck, will refer for EGD to evaluate for sphincter.    Orders     Orders (Selected)   Prescriptions  Prescribed  omeprazole 20 mg oral delayed release capsule: 1 cap(s) ( 20 mg ), PO, Daily, # 90 cap(s), 0 Refill(s), Type: Maintenance, Pharmacy: [facility] #[id number], 1 cap(s) po daily.     Diagnosis     Menometrorrhagia (NMN45-GW N92.1).     Dysmenorrhea (HHA90-QU N94.6).     Orders     Orders (Selected)   Outpatient Orders  Ordered  Referral (Request): 01/12/17 14:49:00 CST, Referred to: Obstetrics & Gynecology,  Menometrorrhagia  Dysmenorrhea.     Diagnosis     BPPV (benign paroxysmal positional vertigo) (ZTM61-PG H81.10).     Course:  Discussed home treatment of positional vertigo. If not getting better, let me know and we will refer to PT..    Orders     Orders (Selected)   Prescriptions  Prescribed  meclizine 25 mg oral tablet: 1 tab(s) ( 25 mg ), PO, TID, PRN: for dizziness, # 30 tab(s), 0 Refill(s), Type: Maintenance, Pharmacy: [facility] #[id number], 1 tab(s) po tid,PRN:for dizziness.

## 2022-02-15 NOTE — NURSING NOTE
Comprehensive Intake Entered On:  1/8/2019 11:02 AM CST    Performed On:  1/8/2019 10:58 AM CST by Senait Melara CMA               Summary   Chief Complaint :   c/o fever, cough, sore throat, nasal congestion and runny nose x 3 days   Menstrual Status :   Menarcheal   Weight Measured :   142.6 lb(Converted to: 142 lb 10 oz, 64.68 kg)    Systolic Blood Pressure :   102 mmHg   Diastolic Blood Pressure :   60 mmHg   Mean Arterial Pressure :   74 mmHg   Peripheral Pulse Rate :   100 bpm   BP Site :   Left arm   BP Method :   Manual   HR Method :   Electronic   Temperature Tympanic :   99.5 DegF(Converted to: 37.5 DegC)    Oxygen Saturation :   98 %   Race :      Languages :   English   Ethnicity :   Not  or    Senait Melara CMA - 1/8/2019 10:58 AM CST   Health Status   Allergies Verified? :   Yes   Medication History Verified? :   Yes   Immunizations Current :   Yes   Medical History Verified? :   Yes   Tobacco Use? :   Former smoker   Senait Melara CMA - 1/8/2019 10:58 AM CST   Consents   Consent for Immunization Exchange :   Consent Granted   Consent for Immunizations to Providers :   Consent Granted   Senait Melara CMA - 1/8/2019 10:58 AM CST   Meds / Allergies   (As Of: 1/8/2019 11:02:05 AM CST)   Allergies (Active)   amoxicillin  Estimated Onset Date:   Unspecified ; Created By:   Mili Olivera; Reaction Status:   Active ; Substance:   amoxicillin ; Updated By:   Mili Olivera; Reviewed Date:   1/8/2019 11:01 AM CST      doxycycline  Estimated Onset Date:   Unspecified ; Reactions:   rash, itchy ; Created By:   Carleen Washington; Reaction Status:   Active ; Category:   Drug ; Substance:   doxycycline ; Type:   Allergy ; Updated By:   Carleen Washington; Reviewed Date:   1/8/2019 11:01 AM CST      penicillin  Estimated Onset Date:   Unspecified ; Created By:   Mili Olivera; Reaction Status:   Active ; Category:   Drug ; Substance:   penicillin ; Type:   Allergy ; Updated By:   Mili Olivera; Reviewed  Date:   1/8/2019 11:01 AM CST      sulfa drug  Estimated Onset Date:   Unspecified ; Created By:   Mili Olivera; Reaction Status:   Active ; Substance:   sulfa drug ; Updated By:   Mili Olivera; Reviewed Date:   1/8/2019 11:01 AM CST        Medication List   (As Of: 1/8/2019 11:02:05 AM [facility])   Prescription/Discharge Order    levocetirizine  :   levocetirizine ; Status:   Prescribed ; Ordered As Mnemonic:   Xyzal 5 mg oral tablet ; Simple Display Line:   5 mg, 1 tab(s), po, qpm, 30 tab(s), 11 Refill(s) ; Ordering Provider:   Jordana Snowden MD; Catalog Code:   levocetirizine ; Order Dt/Tm:   5/24/2018 3:51:49 PM          mometasone nasal  :   mometasone nasal ; Status:   Prescribed ; Ordered As Mnemonic:   Nasonex 50 mcg/inh nasal spray ; Simple Display Line:   2 spray(s), nasal, daily, PRN: for allergy symptoms, 1 EA, 11 Refill(s) ; Ordering Provider:   Jordana Snowden MD; Catalog Code:   mometasone nasal ; Order Dt/Tm:   5/24/2018 3:51:51 PM          montelukast  :   montelukast ; Status:   Prescribed ; Ordered As Mnemonic:   Singulair 10 mg oral tablet ; Simple Display Line:   10 mg, 1 tab(s), PO, qPM, 30 tab(s), 11 Refill(s) ; Ordering Provider:   Jordana Snowden MD; Catalog Code:   montelukast ; Order Dt/Tm:   5/24/2018 3:51:55 PM          omeprazole  :   omeprazole ; Status:   Prescribed ; Ordered As Mnemonic:   omeprazole 20 mg oral delayed release capsule ; Simple Display Line:   20 mg, 1 cap(s), PO, Daily, 30 cap(s), 11 Refill(s) ; Ordering Provider:   Jordana Snowden MD; Catalog Code:   omeprazole ; Order Dt/Tm:   5/24/2018 3:51:57 PM

## 2022-02-15 NOTE — LETTER
(Inserted Image. Unable to display)   January 08, 2021      CAMRYN STEIN  [address], [place] [id number]        Dear CAMRYN,      Thank you for selecting [facility] (previously [facility]) for your healthcare needs.     Our records indicate you are due for the following services:     Annual Physical    (FYI   Regarding office visits: In some instances, a video visit or telephone visit may be offered as an option.)      To schedule an appointment or if you have further questions, please contact your clinic at [phone].      Powered by [employer]    Sincerely,    Sergei Correa PA-C

## 2022-02-15 NOTE — TELEPHONE ENCOUNTER
---------------------  From: [facility] Eastman   Sent: 4/20/2021 5:10:40 PM CDT  Subject: General Message     Notified pt she needs a telephone visit or office visit to fill out her [facility] paperwork per Dr. Moore. Pt states she wants to know why. I stated Dr. Moore requires a visit to fill out paperwork. Pt agrees to be transferred to [facility] but is still questioning why. Transferred.[facility] Paperwork faxed to  Occupational Health @ [phone] Attn: Nevaeh on 4/22/21, 1:34pm. Confirmation Received. Thanks!

## 2022-02-15 NOTE — PROGRESS NOTES
Chief Complaint    right ear pain started this morning, sore throat two days ago, swollen glands, nasal congestion.  History of Present Illness      2 day hx of sx consisting of congestion, lymph nodes feel enlarged, some rhinorrhea but feels like it needs to drain more than it is.  No vomiting or diarrhea. no abdomen pain. no rash.  No sob/difficulty breathing. Does have R ear pain and pressure.  She has had covid 19 vaccine.  Review of Systems      Review of systems is negative with the exception of those noted in HPI          Physical Exam   Vitals & Measurements    T: 97.9  F (Tympanic)  HR: 82 (Peripheral)  RR: 18  BP: 118/64  SpO2: 98%     HT: 60 in  WT: 155 lb  BMI: 30.27           Vitals as above per nursing documentation           Constitutional : nad appears well          Ears: ears patent B, TMS intact, noninjected             Nose: nasal mucosa is non-ededmatous. no discharge           Throat: pharynx is mildly erythematous, no tonsillar hypertrophy, no exudate           Neck: neck supple, no adenopathy, no thyromegaly, no rigidity           Lungs: lungs CTA', no Wheezes, rhonchi or rales           Heart: heart RRR, nl S1, S2 no murmur           skin:  No rashes        RST negative       Covid 19 test pending             Assessment/Plan       1. Otalgia of right ear (H92.01)         conservative measures, warm compresses, ibuprofen. Seudafed may be helpful as she is very congested.  fu if not improving                2. Sore throat (J02.9)         reassured neg RST.  Await covid 19.  Given start of uri sx more likely viral etiology.         Ordered:          Rapid Strep Test (Request), Priority: STAT, Sore throat          SARS-CoV 2 (COVID-19) Ab IgA (Request), Sore throat          Streptococcus, group a culture* ([facility]), Specimen Type: Throat, Collection Date: 08/29/21 9:07:00 CDT                3. Suspected COVID-19 virus infection (Z20.822)         as above, isolate at home. push fluids, fu for  persistent or worsening sx.           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 OK to re-pap one year.     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  Medications    fluticasone 50 mcg/inh nasal spray, See Instructions    levocetirizine 5 mg oral tablet, See Instructions    Protonix 20 mg oral delayed release tablet, 40 mg= 2 tab(s), Oral, daily  Allergies    amoxicillin    doxycycline (rash, itchy)    penicillin    sulfa drug  Social History    Smoking Status     Former smoker     Alcohol - Denies Alcohol Use     Electronic Cigarette/Vaping      Electronic Cigarette Use: Never.     Employment/School      Work/School description: .     Exercise - Does not exercise      Exercise frequency: Never.     Home/Environment      Marital status: . Spouse/Partner name: Mariella.     Other      First menses age 12. Regular menses. Menstrual duration 3-5 days. Cycle interval 28-30 days. History of abnormal Pap smear.     Sexual      Sexually active: Yes.     Substance Abuse - Denies Substance Abuse     Tobacco - Current      5-9 cigarettes  (between 1/4 to 1/2 pack)/day in last 30 days  Family History    Depression: Mother and Aunt.    Diabetes mellitus: Mother.    Hypertension: Father.  Immunizations       Other Immunizations       Administration Dosage Date(s)       Hep B       10/27/1995       MMR (measles/mumps/rubella)       02/27/1976, 08/01/1980       Td       06/01/1990       tetanus/diphth/pertuss (Tdap) adult/adol       09/08/2008

## 2022-02-15 NOTE — TELEPHONE ENCOUNTER
---------------------  From: Bhanu Bella   To: Walker MARQUEZ, Radu REID;     Sent: 2/3/2021 9:28:11 AM CST  Subject: Covid results     Called pt about her negative COVID results. Pt still has a headache but thinks it's sinus related. Told pt to stay quarantined until her symptoms fully resolve for 3 consecutive days. Pt understood and had no questions or concerns at this time.

## 2022-02-15 NOTE — PROGRESS NOTES
Patient:   CAMRYN STEIN            MRN: [id number]            FIN: [id number]               Age:   42 years     Sex:  Female     :  1975   Associated Diagnoses:   Acute recurrent maxillary sinusitis   Author:   Jordana Snowden MD      Chief Complaint   3/17/2017 1:25 PM CDT    c/o sinus drainage/burning sensation x 3 days        History of Present Illness   Patient with congestion and sinus pressure over the past week, worsening for the past 3 days.  had been sick but he is better.  No high fevers, some chills. Slight cough, more nasal drainage.  Starting work again next week after hysterectomy.      Health Status   Allergies:    Allergic Reactions (All)  Severity Not Documented  Amoxicillin (No reactions were documented)  Doxycycline (Rash, itchy)  Penicillin (No reactions were documented)  Sulfa drug (No reactions were documented)  Canceled/Inactive Reactions (All)  No known allergies   Medications:  (Selected)   Prescriptions  Prescribed  FLUoxetine 20 mg oral tablet: 1 tab(s) ( 20 mg ), PO, Daily, # 30 tab(s), 1 Refill(s), Type: Maintenance, Pharmacy: [facility] #[id number], 1 tab(s) po daily  meclizine 25 mg oral tablet: 1 tab(s) ( 25 mg ), PO, TID, PRN: for dizziness, # 30 tab(s), 0 Refill(s), Type: Maintenance, Pharmacy: [facility] #[id number], 1 tab(s) po tid,PRN:for dizziness  omeprazole 20 mg oral delayed release capsule: 1 cap(s) ( 20 mg ), PO, Daily, # 90 cap(s), 0 Refill(s), Type: Maintenance, Pharmacy: [facility] #[id number], 1 cap(s) po daily   Problem list:    All Problems (Selected)  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  Comments:  3/18/2013 CDT 12:14 PM CDT - Nubia Chang  Colposcopy neg. OK to re-pap one year.  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  Depression, Recurrent (ICD-9-.30)  Tobacco user (ICD-9-.1)  Family History of Thyroid Disease (ICD-9-CM V18.19)  Resolved  Chicken Pox (ICD-9-.9): Onset in 1983 at 7 years.  Resolved.  Asthma (ICD-9-.90):  Resolved.  Seasonal Allergies (ICD-9-.9):  Resolved.  Pregnancy (SNOMED CT 225441662):  Resolved in 1995 at 19 years.   Family History:    Diabetes mellitus  Mother  Hypertension  Father  Depression  Mother  Aunt     Procedure history:    Vaginal hysterectomy ([id number]) on 2/17/2017 at 42 Years.  LEEP ([id number]) on 5/10/2004 at 29 Years.  bunionectomy with osteotomy base of left foot in 2004 at 29 Years.  Colposcopy ([id number]).  Comments:  10/19/2012 1:18 PM - Carleen Washington  '93, '95, '04   Social History:        Alcohol Assessment: Denies Alcohol Use                     Comments:                      02/22/2017 - Yissel Gallagher LPN                     Denies alcohol use      Tobacco Assessment: Current                     Comments:                      02/14/2013 - Staci Lisa CMA                     5 per day      Substance Abuse Assessment: Denies Substance Abuse      Exercise and Physical Activity Assessment: Does not exercise            Exercise frequency: Never.      Sexual Assessment            Sexually active: Yes.        Physical Examination   Vital Signs   3/17/2017 1:25 PM CDT Temperature Temporal 98.8 DegF    Peripheral Pulse Rate 87 bpm    Pulse Site Radial artery    Systolic Blood Pressure 104 mmHg    Diastolic Blood Pressure 60 mmHg    Mean Arterial Pressure 75 mmHg    BP Site Right arm    BP Method Manual    Oxygen Saturation 97 %      Measurements from flowsheet : Measurements   3/17/2017 1:25 PM CDT Height Measured - Standard 60 in    Weight Measured - Standard 145.8 lb    BSA 1.67  m2    Body Mass Index 28.47 kg/m2      General:  Alert and oriented, No acute distress.    Eye:  Pupils are equal, round and reactive to light, Normal conjunctiva.    HENT:  Normocephalic, Tympanic membranes are clear, Oral mucosa is moist, No pharyngeal erythema.         Sinus: Bilateral, Maxillary sinus, Tenderness.    Neck:  Supple, Non-tender.    Respiratory:  Lungs are clear to auscultation.    Cardiovascular:  Normal rate, Regular rhythm.       Impression and Plan   Diagnosis     Acute recurrent maxillary sinusitis (FKC26-XA J01.01).     Plan:  Signs and symptoms and over the counter treatment of congestion discussed.  Should resolve over 5-7 days from start of antibiotic.  Return to clinic if severe headache, difficulty swallowing, chest pain, or if symptoms become more severe or any other concerns.  Call with questions..    Orders     Orders   Pharmacy:  Levaquin 750 mg oral tablet (Prescribe): 1 tab(s) ( 750 mg ), PO, q24hr, x 7 day(s), # 7 tab(s), 1 Refill(s), Type: Maintenance, Pharmacy: [facility] #[id number], 1 tab(s) po q 24 hrs,x7 day(s).

## 2022-02-15 NOTE — TELEPHONE ENCOUNTER
---------------------  From: Annabelle Márquez RN   Sent: 8/31/2021 11:38:29 AM CDT  Subject: Result request     Phone message    PCP:   KAH      Time of Call:  1116       Person Calling:  Pt  Phone number:  [phone]    Returned call at: 1135    Note:   Pt calling looking for COVID result.    Called and told pt she would hear about result when we get them, either today or tomorrow afternoon. Pt had no other questions.tried to call pt without success. LM to return my call

## 2022-02-15 NOTE — TELEPHONE ENCOUNTER
---------------------  From: Elgin Dockery CMA ([id number]   To: Appointment Pool ([id number]);     Sent: 2/1/2021 1:14:58 PM CST !  Subject: covid testing     Please add pt to the covid schedule today per [facility].  Thank you Elgin Peralta scheduled

## 2022-02-15 NOTE — NURSING NOTE
Seen for COVID testing at [facility] per Dr. SORIANO    O2 Sat = 98%  (Children under 12 do not require O2 sat)    Specimen sent to:   labs for testing    PUI form faxed to [facility] if positive

## 2022-02-15 NOTE — PROGRESS NOTES
Chief Complaint    Pt c/o runny/stuffy nose, sinus pressure, dry cough unless she takes a shower she then coughs up white to green phlegm. She states she gets sinus infections or bronchitis every year. telephone visit  History of Present Illness      Today's visit was conducted via telemedicine, telephone, from clinic to patient in [place] due to the COVID-19 pandemic.       Patient consent, as follows, for telemedicine visit was obtained.   You have been scheduled for a telemedicine visit, which is a billable service.  This is a replacement for a face-to-face visit that is being recommended at this time to help keep our patient safe.  If, during our visit , we decide that you need a face-to-face visit, this visit will be canceled and you will be rescheduled to come into the clinic for a face to face appointment.  Can we proceed with a telemedicine visit?  [id number]      HPI      pt reports 2 days of sore throat, cough occ productive, nasal congestion, tooth pain, facial pressure, occ green rhinorrhea, no sob, no loss of taste/smell, no GI sx, no fever      reports seasonal allergies in the Spring, not taking any antihistamine or her monteleukast or a decongestent      clears throat frequently speaking in fulll sentences without sob no coughing heard on phone appt      ROS 10 point ROS is neg except as per HPI      Discussed:      Contact clinic if sx worsen or do not improve.       Also discussed methods to reduce risks of Covid-19 including social isolation and avoid touching face and frequent, adequate hand washing.   Assessment/Plan       Cough (R05)         Ordered:          azithromycin, = 1 packet(s), Oral, once, Instructions: as directed on package labeling, # 6 tab(s), 0 Refill(s), Type: Soft Stop, Pharmacy: [facility], 1 packet(s) Oral once,Instr:as directed on package la..., (Ordered)          [id number] physician telephone evaluation 11-20 min  (Charge), Quantity: 1, Cough  Rhinorrhea  Facial pain  Seasonal Allergies  Maxillary sinusitis                Facial pain (R51.9)         Ordered:          azithromycin, = 1 packet(s), Oral, once, Instructions: as directed on package labeling, # 6 tab(s), 0 Refill(s), Type: Soft Stop, Pharmacy: [facility], 1 packet(s) Oral once,Instr:as directed on package la..., (Ordered)          [id number] physician telephone evaluation 11-20 min (Charge), Quantity: 1, Cough  Rhinorrhea  Facial pain  Seasonal Allergies  Maxillary sinusitis                Maxillary sinusitis (J32.0)         Ordered:          azithromycin, = 1 packet(s), Oral, once, Instructions: as directed on package labeling, # 6 tab(s), 0 Refill(s), Type: Soft Stop, Pharmacy: [facility], 1 packet(s) Oral once,Instr:as directed on package la..., (Ordered)          [id number] physician telephone evaluation 11-20 min (Charge), Quantity: 1, Cough  Rhinorrhea  Facial pain  Seasonal Allergies  Maxillary sinusitis                Rhinorrhea (J34.89)         Ordered:          azithromycin, = 1 packet(s), Oral, once, Instructions: as directed on package labeling, # 6 tab(s), 0 Refill(s), Type: Soft Stop, Pharmacy: [facility], 1 packet(s) Oral once,Instr:as directed on package la..., (Ordered)          13835 physician telephone evaluation 11-20 min (Charge), Quantity: 1, Cough  Rhinorrhea  Facial pain  Seasonal Allergies  Maxillary sinusitis                Seasonal Allergies (J30.2)         Ordered:          azithromycin, = 1 packet(s), Oral, once, Instructions: as directed on package labeling, # 6 tab(s), 0 Refill(s), Type: Soft Stop, Pharmacy: [facility], 1 packet(s) Oral once,Instr:as directed on package la..., (Ordered)           [id number] physician telephone evaluation 11-20 min (Charge), Quantity: 1, Cough  Rhinorrhea  Facial pain  Seasonal Allergies  Maxillary sinusitis               encouraged pt to use nasal saline irrigation, decongestent, antihistamine before trying antibiotic as this may be just allergies, also need to test for covid so she can return to work, not supposed to work if ill, will have css contact pt to help coordinate covid curbside testing  Patient Information     Name:CAMRYN STEIN      Address:      [address] [place] [id number]     Sex:Female     YOB: 1975     [phone]     Emergency Contact:MARIELLA STEIN     MRN:[id number]     FIN:[id number]     Location:[facility]     Date of Service:03/25/2021      Primary Care Physician:       Sergei Correa PA-C, [phone]      Attending Physician:       Claire Moore MD, [phone]  Problem List/Past Medical History    Ongoing     ASCUS with positive high risk HPV       Comments: Colposcopy neg. OK to re-pap one year.     Depression, Recurrent     Family History of Thyroid Disease     LGSIL on Pap Smear     Tobacco user    Historical     Asthma     Chicken Pox     Pregnancy     Seasonal Allergies  Procedure/Surgical History     Vaginal hysterectomy (02/17/2017)           LEEP (05/10/2004)           bunionectomy with osteotomy base of left foot (2004)           Colposcopy            Comments: '93, '95, '04.  Medications    fluticasone 50 mcg/inh nasal spray, See Instructions    levocetirizine 5 mg oral tablet, See Instructions    Zithromax Z-Sohail 250 mg oral tablet, Take 2 tablets on Day 1 and then 1 tablet, Oral, daily  Allergies    amoxicillin    doxycycline (rash, itchy)    penicillin    sulfa drug  Social History    Smoking Status     Former smoker     Alcohol - Denies Alcohol Use     Electronic Cigarette/Vaping      Electronic Cigarette Use: Never.     Employment/School      Work/School description: .      Exercise - Does not exercise      Exercise frequency: Never.     Home/Environment      Marital status: . Spouse/Partner name: Ramesh.     Other      First menses age 12. Regular menses. Menstrual duration 3-5 days. Cycle interval 28-30 days. History of abnormal Pap smear.     Sexual      Sexually active: Yes.     Substance Abuse - Denies Substance Abuse     Tobacco - Current      5-9 cigarettes (between 1/4 to 1/2 pack)/day in last 30 days  Family History    Depression: Mother and Aunt.    Diabetes mellitus: Mother.    Hypertension: Father.  Immunizations      Vaccine Date Status          tetanus/diphth/pertuss (Tdap) adult/adol 09/08/2008 Recorded          Hep B 10/27/1995 Recorded          Td 06/01/1990 Recorded          MMR (measles/mumps/rubella) 08/01/1980 Recorded          MMR (measles/mumps/rubella) 02/27/1976 Recorded  Lab Results       Lab Results (Last 4 results within 90 days)        Coronavirus SARS-CoV-2 (COVID-19) TR: Negative (02/01/21 14:35:00)

## 2022-02-15 NOTE — TELEPHONE ENCOUNTER
---------------------  From: Senait Melara CMA (eRx Pool ([id number] - [place]))   To: Sergei Correa PA-C;     Sent: 4/13/2020 9:20:49 AM CDT  Subject: FW: Medication Management   Due Date/Time: 4/12/2020 12:48:00 PM CDT           Entered by Senait Melara CMA on April 13, 2020 9:20:44 AM CDT  PCP: [facility]  last seen: 3/13/2020 Pharyngitis  last filled: 5/24/2018 #30, 11 refills    Please advise med request.    ** Patient matched by Senait Melara CMA on 4/13/2020 9:19:15 AM CDT **      ------------------------------------------  From: [employer]  To: Sergei Correa PA-C  Sent: April 11, 2020 12:48:56 PM CDT  Subject: Medication Management  Due: April 12, 2020 12:48:56 PM CDT    ** On Hold Pending Signature **  Drug: levocetirizine (levocetirizine 5 mg oral tablet)  ***********PT REQUESTING NEW RX***********  Quantity: 1 tab(s)  Days Supply: 1  Refills: 0  Substitutions Allowed  Notes from Pharmacy:     Dispensed Drug: levocetirizine (levocetirizine 5 mg oral tablet)  ***********PT REQUESTING NEW RX***********  Quantity: 1 tab(s)  Days Supply: 1  Refills: 0  Substitutions Allowed  Notes from Pharmacy:   ---------------------------------------------------------------  From: Sergei Correa PA-C   To: [employer]    Sent: 4/13/2020 9:30:47 AM CDT  Subject: FW: Medication Management     ** Approved **  levocetirizine (levocetirizine 5 mg tablet)  ***********PT REQUESTING NEW RX***********  Qty:  1 tab(s)        Days Supply:  1        Refills:  0          Substitutions Allowed     Route To Pharmacy - [facility]

## 2022-02-15 NOTE — NURSING NOTE
Patient will be having a TVH with [facility] on 2/17/17.  Discussed education, all questions answered.  Will expect a call from [facility] 24-48 hours prior.  Will plan to schedule preop 5-7 days prior with [facility].

## 2022-02-15 NOTE — NURSING NOTE
Comprehensive Intake Entered On:  2/1/2021 12:33 PM CST    Performed On:  2/1/2021 12:28 PM CST by Elgin Dockery CMA               Summary   Chief Complaint :   Verbal consent given for a telephone visit. Pt is c/o sinus pressure,nausea,fatigue,runny nose,sore throat and diarrhea x week.   Menstrual Status :   Menarcheal   Race :      Languages :   English   Ethnicity :   Not  or    Elgin Dockery CMA - 2/1/2021 12:28 PM CST   Health Status   Allergies Verified? :   Yes   Medication History Verified? :   Yes   Immunizations Current :   Yes   Medical History Verified? :   Yes   Pre-Visit Planning Status :   Not completed   Tobacco Use? :   Former smoker   Elgin Dockery CMA - 2/1/2021 12:28 PM CST   Meds / Allergies   (As Of: 2/1/2021 12:33:01 PM CST)   Allergies (Active)   amoxicillin  Estimated Onset Date:   Unspecified ; Created By:   Mili Olivera; Reaction Status:   Active ; Substance:   amoxicillin ; Updated By:   Mili Olivera; Reviewed Date:   2/1/2021 12:31 PM CST      doxycycline  Estimated Onset Date:   Unspecified ; Reactions:   rash, itchy ; Created By:   Carleen Washington; Reaction Status:   Active ; Category:   Drug ; Substance:   doxycycline ; Type:   Allergy ; Updated By:   Carleen Washington; Reviewed Date:   2/1/2021 12:31 PM CST      penicillin  Estimated Onset Date:   Unspecified ; Created By:   Mili Olivera; Reaction Status:   Active ; Category:   Drug ; Substance:   penicillin ; Type:   Allergy ; Updated By:   Mili Olivera; Reviewed Date:   2/1/2021 12:31 PM CST      sulfa drug  Estimated Onset Date:   Unspecified ; Created By:   Mili Olivera; Reaction Status:   Active ; Substance:   sulfa drug ; Updated By:   Mili Olivera; Reviewed Date:   2/1/2021 12:31 PM CST        Medication List   (As Of: 2/1/2021 12:33:01 PM CST)   Prescription/Discharge Order    omeprazole 20 mg oral delayed release capsule  :   omeprazole 20 mg oral delayed release capsule ; Status:   Processing ;  Ordered As Mnemonic:   omeprazole 20 mg oral delayed release capsule ; Ordering Provider:   Sergei Correa PA-C; Action Display:   Complete ; Catalog Code:   omeprazole ; Order Dt/Tm:   2/1/2021 12:31:23 PM CST          levocetirizine 5 mg oral tablet  :   levocetirizine 5 mg oral tablet ; Status:   Prescribed ; Ordered As Mnemonic:   levocetirizine 5 mg oral tablet ; Simple Display Line:   See Instructions, ***********PT REQUESTING NEW RX***********, 1 tab(s) ; Ordering Provider:   Sergei Correa PA-C; Catalog Code:   levocetirizine ; Order Dt/Tm:   4/13/2020 9:30:46 AM CDT          fluticasone 50 mcg/inh nasal spray  :   fluticasone 50 mcg/inh nasal spray ; Status:   Prescribed ; Ordered As Mnemonic:   fluticasone 50 mcg/inh nasal spray ; Simple Display Line:   See Instructions, ****PT NEEDS NEW RX***************, 1 gm ; Ordering Provider:   Sergei Correa PA-C; Catalog Code:   fluticasone nasal ; Order Dt/Tm:   4/13/2020 9:30:31 AM CDT            ID Risk Screen   Recent Travel History :   No recent travel   Family Member Travel History :   No recent travel   Other Exposure to Infectious Disease :   Unknown   COVID-19 Testing Status :   No positive COVID-19 test   Elgin Dockery CMA - 2/1/2021 12:28 PM CST   Social History   Social History   (As Of: 2/1/2021 12:33:01 PM CST)   Alcohol:  Denies Alcohol Use       Comments:  2/22/2017 10:46 AM - Yissel Gallagher LPN: Denies alcohol use   (Last Updated: 2/22/2017 10:46:53 AM CST by Yissel Gallagher LPN)          Tobacco:  Current      5-9 cigarettes (between 1/4 to 1/2 pack)/day in last 30 days   Comments:  2/14/2013 2:49 PM - Staci Lisa CMA: 5 per day   (Last Updated: 2/1/2021 12:29:20 PM CST by Elgin Dockery CMA)          Electronic Cigarette/Vaping:        Electronic Cigarette Use: Never.   (Last Updated: 2/1/2021 12:29:24 PM CST by Elgin Dockery CMA)          Substance Abuse:  Denies Substance Abuse      (Last Updated: 1/31/2011 9:37:25 AM CST by Byron RAVI  Mary Ellen Reddy         Employment/School:        Work/School description: .   (Last Updated: 10/10/2017 1:12:51 PM CDT by Tatianna Caruso)          Home/Environment:        Marital status: .  Spouse/Partner name: Ramesh.   (Last Updated: 10/10/2017 1:11:59 PM CDT by Tatianna Caruso)          Exercise:  Does not exercise      Exercise frequency: Never.   (Last Updated: 2/14/2013 2:49:48 PM CST by Staci Lisa CMA)          Sexual:        Sexually active: Yes.   (Last Updated: 2/14/2013 2:49:48 PM CST by Staci Lisa CMA)          Other:        First menses age 12.  Regular menses.  Menstrual duration 3-5 days.  Cycle interval 28-30 days.  History of abnormal Pap smear.   (Last Updated: 10/10/2017 1:12:34 PM CDT by Tatianna Caruso)

## 2022-02-15 NOTE — TELEPHONE ENCOUNTER
---------------------  From: Senait Melara CMA (Phone Messages Pool ([id number]))   Sent: 3/25/2021 1:28:16 PM CDT  Subject: phone note- Needs work note     Phone Message      Time of Call:  1:24 pm       Person Calling:  [employer]  Phone number:  _    Note:  Patient called stating she needs a note for work. Stating she had a visit today and is having COVID testing today. Patient is not able to return to work pending on COVID test results.     Patient is having COVID testing today @ 2pm. Hoping letter can be given to her at that time.     Last office visit and reason:  3/25/2021 [facility] telephone appt

## 2022-02-15 NOTE — TELEPHONE ENCOUNTER
---------------------  From: Danitza Magana CMA   To: Phone Messages Pool ([id number]_[place] - [place]);     Sent: 8/31/2021 4:56:22 PM CDT  Subject: covid results     Tried to contact pt with negative covid results without success.  LM to return my call5:59pm: TC to pt, informing of negative covid results. Pt requested a copy of results for her work.  Pt stated she is still having ear pain despite using sudafed and flonase. Pt will call in the morning and request appt for ear pain.

## 2022-02-15 NOTE — NURSING NOTE
Comprehensive Intake Entered On:  2/19/2020 1:40 PM CST    Performed On:  2/19/2020 1:37 PM CST by Senait Melara CMA   Chief Complaint :   c/o HA, sinus pressure, ear pain, sore throat x 1 week; getting worse; tried OTC sudafed, aspirin   Menstrual Status :   Menarcheal   Weight Measured :   143.8 lb(Converted to: 143 lb 13 oz, 65.23 kg)    Height Measured :   60 in(Converted to: 5 ft 0 in, 152.40 cm)    Body Mass Index :   28.08 kg/m2 (HI)    Body Surface Area :   1.66 m2   Systolic Blood Pressure :   110 mmHg   Diastolic Blood Pressure :   70 mmHg   Mean Arterial Pressure :   83 mmHg   Peripheral Pulse Rate :   76 bpm   BP Site :   Left arm   BP Method :   Manual   HR Method :   Electronic   Temperature Tympanic :   98.1 DegF(Converted to: 36.7 DegC)    Oxygen Saturation :   99 %   Race :      Languages :   English   Ethnicity :   Not  or    Senait Melara CMA - 2/19/2020 1:37 PM CST   Health Status   Allergies Verified? :   Yes   Medication History Verified? :   Yes   Immunizations Current :   Yes   Medical History Verified? :   Yes   Senait Melara CMA - 2/19/2020 1:37 PM CST   Consents   Consent for Immunization Exchange :   Consent Granted   Consent for Immunizations to Providers :   Consent Granted   Senait Melara CMA - 2/19/2020 1:37 PM CST   Meds / Allergies   (As Of: 2/19/2020 1:40:57 PM CST)   Allergies (Active)   amoxicillin  Estimated Onset Date:   Unspecified ; Created By:   Mili Olivera; Reaction Status:   Active ; Substance:   amoxicillin ; Updated By:   Mili Olivera; Reviewed Date:   2/19/2020 1:38 PM CST      doxycycline  Estimated Onset Date:   Unspecified ; Reactions:   rash, itchy ; Created By:   Carleen Washington; Reaction Status:   Active ; Category:   Drug ; Substance:   doxycycline ; Type:   Allergy ; Updated By:   Carleen Washington; Reviewed Date:   2/19/2020 1:38 PM CST      penicillin  Estimated Onset Date:   Unspecified ; Created By:   Mili Olivera;  Reaction Status:   Active ; Category:   Drug ; Substance:   penicillin ; Type:   Allergy ; Updated By:   Mili Olivera; Reviewed Date:   2/19/2020 1:38 PM CST      sulfa drug  Estimated Onset Date:   Unspecified ; Created By:   Mili Olivera; Reaction Status:   Active ; Substance:   sulfa drug ; Updated By:   Mili Olivera; Reviewed Date:   2/19/2020 1:38 PM CST        Medication List   (As Of: 2/19/2020 1:40:57 PM CST)   No Known Home Medications     Senait Melara CMA - 2/19/2020 1:39:17 PM

## 2022-02-15 NOTE — LETTER
(Inserted Image. Unable to display)   May 24[birth year] 2019      CAMRYN STEIN  [address] [place], [place] [id number]        Dear CAMRYN,      Thank you for selecting [facility] (previously [facility]) for your healthcare needs.     Our records indicate you are due for the following services:     Annual Physical    To schedule an appointment or if you have further questions, please contact your primary clinic:   [facility]          [phone]   [facility]    [phone]             [facility]         [phone]      Powered by [employer]    Sincerely,    Jordana Snowden M.D.

## 2022-02-15 NOTE — NURSING NOTE
Comprehensive Intake Entered On:  3/13/2020 3:59 PM CDT    Performed On:  3/13/2020 3:55 PM CDT by Meena Pratt MA               Summary   Chief Complaint :   Sore throat, chills x 2 days    Menstrual Status :   Menarcheal   Weight Measured :   143 lb(Converted to: 143 lb 0 oz, 64.86 kg)    Height Measured :   60 in(Converted to: 5 ft 0 in, 152.40 cm)    Body Mass Index :   27.92 kg/m2 (HI)    Body Surface Area :   1.66 m2   Systolic Blood Pressure :   112 mmHg   Diastolic Blood Pressure :   60 mmHg   Mean Arterial Pressure :   77 mmHg   Peripheral Pulse Rate :   72 bpm   BP Site :   Left arm   Pulse Site :   Radial artery   BP Method :   Manual   HR Method :   Manual   Temperature Tympanic :   98.6 DegF(Converted to: 37.0 DegC)    Race :      Languages :   English   Ethnicity :   Not  or    Meena Pratt MA - 3/13/2020 3:55 PM CDT   Health Status   Allergies Verified? :   Yes   Medication History Verified? :   Yes   Immunizations Current :   Yes   Medical History Verified? :   Yes   Pre-Visit Planning Status :   Completed   Tobacco Use? :   Former smoker   Meena Pratt MA - 3/13/2020 3:55 PM CDT   Consents   Consent for Immunization Exchange :   Consent Granted   Consent for Immunizations to Providers :   Consent Granted   Meena Pratt MA - 3/13/2020 3:55 PM CDT   Meds / Allergies   (As Of: 3/13/2020 3:59:09 PM CDT)   Allergies (Active)   amoxicillin  Estimated Onset Date:   Unspecified ; Created By:   Mili Olivera; Reaction Status:   Active ; Substance:   amoxicillin ; Updated By:   Mili Olivera; Reviewed Date:   3/13/2020 3:56 PM CDT      doxycycline  Estimated Onset Date:   Unspecified ; Reactions:   rash, itchy ; Created By:   Carleen Washington; Reaction Status:   Active ; Category:   Drug ; Substance:   doxycycline ; Type:   Allergy ; Updated By:   Carleen Washington; Reviewed Date:   3/13/2020 3:56 PM CDT      penicillin  Estimated Onset Date:   Unspecified ; Created By:   Jarod  Mili; Reaction Status:   Active ; Category:   Drug ; Substance:   penicillin ; Type:   Allergy ; Updated By:   Mili Olivera; Reviewed Date:   3/13/2020 3:56 PM CDT      sulfa drug  Estimated Onset Date:   Unspecified ; Created By:   Mili Olivera; Reaction Status:   Active ; Substance:   sulfa drug ; Updated By:   Mili Olivera; Reviewed Date:   3/13/2020 3:56 PM CDT        Medication List   (As Of: 3/13/2020 3:59:09 PM CDT)   No Known Home Medications     Meena Pratt MA - 3/13/2020 3:55:57 PM

## 2022-02-15 NOTE — TELEPHONE ENCOUNTER
---------------------  From: Sergei Correa PA-C   [id number]     Sent: 2/19/2020 9:12:39 AM CST  Subject: General Message     Can we get general surgery consult notes from [facility]?    KAH---------------------  From: Meena Pratt MA (Phone [employer] Pool ([id number]_[place] - [place])   To: Sergei Correa PA-C;     Sent: 2/19/2020 9:20:32 AM CST  Subject: RE: General Message     Called [facility], they do not have the signed note yet. They will contact his nurse to get it signed and fax it over to us.

## 2022-02-15 NOTE — PROGRESS NOTES
Patient:   CAMRYN STEIN            MRN: [id number]            FIN: [id number]               Age:   43 years     Sex:  Female     :  1975   Associated Diagnoses:   Epigastric pain; Iron deficiency; Fatigue; Seasonal allergies   Author:   Jordana Snowden MD      Chief Complaint   2018 3:26 PM CDT    Allergy Med Check; also c/o pain in upper abdomen; worse after work 4-5 pm; tightness feeling; started on Monday      History of Present Illness   patient with 2 episodes of upper abdominal and back pain, feels very achy, comes on later in the day, tried acid reducers (zantac) without help  first in 2017, recurred again a couple of days ago  Chief complaint and symptoms reviewed with patient and confirmed as above.  pain is epigastric, worse with eating  not vomiting, no fevers, no changes in stools.  No urinary symptoms      Health Status   Allergies:    Allergic Reactions (All)  Severity Not Documented  Amoxicillin (No reactions were documented)  Doxycycline (Rash, itchy)  Penicillin (No reactions were documented)  Sulfa drug (No reactions were documented)  Canceled/Inactive Reactions (All)  No known allergies   Medications:  (Selected)   Prescriptions  Prescribed  FLUoxetine 20 mg oral tablet: 1 tab(s) ( 20 mg ), PO, Daily, # 90 tab(s), 3 Refill(s), Type: Maintenance, Pharmacy: [facility] #[id number], 1 tab(s) po daily  Nasonex 50 mcg/inh nasal spray: 2 spray(s), nasal, daily, PRN: for allergy symptoms, # 1 EA, 2 Refill(s), Type: Maintenance, Pharmacy: [facility] #[id number], 2 spray(s) nasal daily,PRN:for allergy symptoms  Singulair 10 mg oral tablet: 1 tab(s) ( 10 mg ), PO, qPM, # 30 tab(s), 2 Refill(s), Type: Maintenance, Pharmacy: [facility] #[id number], 1 tab(s) po qpm  Xyzal 5 mg oral tablet: 1 tab(s) ( 5 mg ), po, qpm, # 30 tab(s), 2 Refill(s), Type: Maintenance, Pharmacy: [facility] #[id number], 1 tab(s) po qpm  meclizine 25 mg oral tablet: 1 tab(s) ( 25 mg ), PO, TID, PRN: for dizziness, # 30  tab(s), 0 Refill(s), Type: Maintenance, Pharmacy: [facility] #[id number], 1 tab(s) po tid,PRN:for dizziness  omeprazole 20 mg oral delayed release capsule: 1 cap(s) ( 20 mg ), PO, Daily, # 90 cap(s), 3 Refill(s), Type: Maintenance, Pharmacy: [facility] #[id number], 1 cap(s) po daily,    Medications          *denotes recorded medication          FLUoxetine 20 mg oral tablet: 20 mg, 1 tab(s), PO, Daily, 90 tab(s), 3 Refill(s).          Xyzal 5 mg oral tablet: 5 mg, 1 tab(s), po, qpm, 30 tab(s), 2 Refill(s).          meclizine 25 mg oral tablet: 25 mg, 1 tab(s), PO, TID, PRN: for dizziness, 30 tab(s), 0 Refill(s).          Nasonex 50 mcg/inh nasal spray: 2 spray(s), nasal, daily, PRN: for allergy symptoms, 1 EA, 2 Refill(s).          Singulair 10 mg oral tablet: 10 mg, 1 tab(s), PO, qPM, 30 tab(s), 2 Refill(s).          omeprazole 20 mg oral delayed release capsule: 20 mg, 1 cap(s), PO, Daily, 90 cap(s), 3 Refill(s).     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  Comments:  3/18/2013 CDT 12:14 PM VASILET - Marlene WILSON, [facility]  Colposcopy neg. OK to re-pap one year.  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  Depression, Recurrent (ICD-9-.30)  Tobacco user (ICD-9-.1)  Family History of Thyroid Disease (ICD-9-CM V18.19)  Resolved  Chicken Pox (ICD-9-.9): Onset in 1983 at 7 years.  Resolved.  Asthma (ICD-9-.90):  Resolved.  Seasonal Allergies (ICD-9-.9):  Resolved.  Pregnancy (SNOMED CT 094846187):  Resolved in 1995 at 19 years.   Family History:    Diabetes  mellitus  Mother  Hypertension  Father  Depression  Mother  Aunt     Procedure history:    Vaginal hysterectomy ([id number]) on 2/17/2017 at 42 Years.  LEEP ([id number]) on 5/10/2004 at 29 Years.  bunionectomy with osteotomy base of left foot in 2004 at 29 Years.  Colposcopy ([id number]).  Comments:  10/19/2012 1:18 PM - Carleen Washington  '93, '95, '04   Social History:        Alcohol Assessment: Denies Alcohol Use                     Comments:                      02/22/2017 - Yissel Gallagher LPN                     Denies alcohol use      Tobacco Assessment: Current                     Comments:                      02/14/2013 - Staci Lisa CMA                     5 per day      Substance Abuse Assessment: Denies Substance Abuse      Employment and Education Assessment            Work/School description: .      Home and Environment Assessment            Marital status: .  Spouse/Partner name: Ramesh.      Exercise and Physical Activity Assessment: Does not exercise            Exercise frequency: Never.      Sexual Assessment            Sexually active: Yes.      Other Assessment            First menses age 12.  Regular menses.  Menstrual duration 3-5 days.  Cycle interval 28-30 days.  History of               abnormal Pap smear.        Physical Examination   Vital Signs   5/24/2018 3:26 PM CDT Temperature Tympanic 99.1 DegF    Peripheral Pulse Rate 84 bpm    Pulse Site Radial artery    HR Method Manual    Systolic Blood Pressure 100 mmHg    Diastolic Blood Pressure 62 mmHg    Mean Arterial Pressure 75 mmHg    BP Site Right arm    BP Method Manual      Measurements from flowsheet : Measurements   5/24/2018 3:26 PM CDT Height Measured - Standard 60 in    Weight Measured - Standard 136 lb    BSA 1.61 m2    Body Mass Index 26.56 kg/m2  HI      General:  Alert and oriented, No acute distress.    Eye:  Pupils are equal, round and reactive to light, Normal conjunctiva.    HENT:  Normocephalic, Tympanic  membranes are clear, Oral mucosa is moist, No pharyngeal erythema, No sinus tenderness.    Neck:  Supple, Non-tender, No lymphadenopathy.    Respiratory:  Lungs are clear to auscultation.    Cardiovascular:  Normal rate, Regular rhythm.    Gastrointestinal:  Soft, Non-distended, Normal bowel sounds, epigastric tenderness.    Genitourinary:  No costovertebral angle tenderness.       Impression and Plan   Diagnosis     Epigastric pain (XXG60-TF R10.13).     Iron deficiency (LKT52-IJ E61.1).     Fatigue (YSI17-QK R53.83).     Course:  Not improving.    Plan:  restart PPI.    Orders     Orders (Selected)   Outpatient Orders  Ordered  US Right Upper Quadrant (Request): Epigastric pain  Ordered (In Transit)  CBC (h/h, RBC, indices, WBC, Plt)* ([facility]): Specimen Type: Blood, Collection Date: 05/24/18 15:48:00 CDT  Comprehensive Metabolic Panel* ([facility]): Specimen Type: Serum, Collection Date: 05/24/18 15:48:00 CDT  Iron, Total and Total Iron Binding Capacity* ([facility]): Specimen Type: Serum, Collection Date: 05/24/18 15:52:00 CDT  Lipase* ([facility]): Specimen Type: Serum, Collection Date: 05/24/18 15:48:00 CDT  TSH* ([facility]): Specimen Type: Serum, Collection Date: 05/24/18 15:52:00 CDT  Prescriptions  Prescribed  omeprazole 20 mg oral delayed release capsule: 1 cap(s) ( 20 mg ), PO, Daily, # 30 cap(s), 11 Refill(s), Type: Maintenance, Pharmacy: [facility] #[id number], 1 cap(s) po daily.     Diagnosis     Seasonal allergies (TPX75-JR J30.2).     Orders     Orders (Selected)   Prescriptions  Prescribed  Nasonex 50 mcg/inh nasal spray: 2 spray(s), nasal, daily, PRN: for allergy symptoms, # 1 EA, 11 Refill(s), Type: Maintenance, Pharmacy: [facility] #[id number], 2 spray(s) nasal daily,PRN:for allergy symptoms  Singulair 10 mg oral tablet: 1 tab(s) ( 10 mg ), PO, qPM, # 30 tab(s), 11 Refill(s), Type: Maintenance, Pharmacy: [facility] #[id number], 1 tab(s) po qpm  Xyzal 5 mg oral tablet: 1 tab(s) ( 5 mg ), po, qpm, # 30 tab(s), 11  Refill(s), Type: Maintenance, Pharmacy: [facility] #[id number], 1 tab(s) po qpm.

## 2022-02-15 NOTE — TELEPHONE ENCOUNTER
---------------------From: Senait Melara CMA (Phone Messages Pool ([id number] To: Referral Coordinators Pool ([id number]_[facility]);   Sent: 1/23/2020 1:37:10 PM CSTSubject: General Message Patient left message on Nurse Voicemail, asking to change surgeon from [facility] to [facility]. Patient unsure if Insurance will cover there. Can you please help patient. Thanks.---------------------From: Nicky Carlos (Referral Coordinators Pool ([id number]_[facility])) To: Phone [place] Pool ([id number]_[facility]);   Sent: 1/23/2020 1:47:48 PM CSTSubject: RE: General Message I left a message for her earlier.  Nicky

## 2022-02-15 NOTE — PROGRESS NOTES
Patient:   CAMRYN STEIN            MRN: [id number]            FIN: [id number]               Age:   41 years     Sex:  Female     :  1975   Associated Diagnoses:   Excessive and frequent menstruation with regular cycle   Author:   Kapil Ge MD      Visit Information      Date of Service: 2017 12:41 pm  Performing Location: [facility]  Encounter#: [id number]      Primary Care Provider (PCP):  Sergei Correa PA-C    NPI# [id number]      Referring Provider:  No referring provider recorded for selected visit.      Chief Complaint   2017 12:51 PM CST   Patient is here to discuss options for Menometrorrhagia per [facility].      Interval History   The patient is seen in consultation for Dr. Snowden regarding menorrhagia and dysmenorrhea.  Dr. Snowden offered OCP therapy and the patient has been referred for further consultation.  The patient presents with regular monthly menses which last about four days but are very heavy and crampy and she misses work at least every other period with her pain and bleeding.  She has a 21-year-old daughter delivered with normal delivery and desired no future pregnancies beyond that.  She is certain she wants no future pregnancies at this point.  She has no other GYN concerns.  She has some minor stress incontinence but it is very occasional and not very troublesome.  She has no pain other than with menses.  She is otherwise healthy and has no concerns.        Review of Systems   Review  of systems is negative except as documented under interval history.      Health Status   Allergies:    Allergic Reactions (Selected)  Severity Not Documented  Amoxicillin (No reactions were documented)  Doxycycline (Rash, itchy)  Penicillin (No reactions were documented)  Sulfa drug (No reactions were documented)   Medications:  (Selected)   Prescriptions  Prescribed  buPROPion 100 mg/12 hours (SR) oral tablet, extended release: 1 tab(s) ( 100 mg ), PO, BID, # 60 tab(s), 1  Refill(s), Type: Maintenance, Pharmacy: [facility] #[id number], 1 tab(s) po bid  meclizine 25 mg oral tablet: 1 tab(s) ( 25 mg ), PO, TID, PRN: for dizziness, # 30 tab(s), 0 Refill(s), Type: Maintenance, Pharmacy: [facility] #[id number], 1 tab(s) po tid,PRN:for dizziness  omeprazole 20 mg oral delayed release capsule: 1 cap(s) ( 20 mg ), PO, Daily, # 90 cap(s), 0 Refill(s), Type: Maintenance, Pharmacy: [facility] #[id number], 1 cap(s) po daily   Problem list:    All Problems  LGSIL on Pap Smear / ICD-9-.03 / Confirmed  Depression, Recurrent / ICD-9-.30 / Confirmed  Tobacco user / ICD-9-.1 / Confirmed  Family History of Thyroid Disease / ICD-9-CM V18.19 / Confirmed  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  Comments:  3/18/2013 CDT 12:14 PM CDT - Marlene WILSON, [facility]  Colposcopy neg. OK to re-pap one year.  LGSIL on Pap Smear (795.03): Onset in  at 28 years.  Depression, Recurrent (296.30)  Tobacco user (305.1)  Family History of Thyroid Disease (V18.19)  Resolved  Chicken Pox (052.9): Onset in  at 7 years.  Resolved.  Asthma (493.90):  Resolved.  Seasonal Allergies (477.9):  Resolved.  Pregnancy (037779015):  Resolved in  at 19 years.   Family History:    Diabetes mellitus  Mother  Hypertension  Father  Depression  Mother  Aunt     Procedure history:    LEEP ([id number]) on 5/10/2004 at 29 Years.  bunionectomy with osteotomy base of left foot in  at 29 Years.   (normal spontaneous vaginal delivery) ([id number]) in  at 20 Years.  Colposcopy  ([id number]).  Comments:  10/19/2012 1:18 PM - Carleen Washington  '93, '95, '04   Social History:        Alcohol Assessment: Denies Alcohol Use      Tobacco Assessment: Current                     Comments:                      02/14/2013 - SloanStaci tsai CMA                     5 per day      Substance Abuse Assessment: Denies Substance Abuse      Exercise and Physical Activity Assessment: Does not exercise            Exercise frequency: Never.      Sexual Assessment            Sexually active: Yes.        Physical Examination   Vital Signs   1/25/2017 12:51 PM CST Peripheral Pulse Rate 80 bpm    HR Method Electronic    Systolic Blood Pressure 108 mmHg    Diastolic Blood Pressure 73 mmHg    Mean Arterial Pressure 85 mmHg    BP Site Right arm    BP Method Electronic      Gynecologic:  Bimanual examination finds the uterus retroverted and normal in size.  The uterus seems to move normally and there is no pelvic nodularity or palpable mass.  Ultrasound is done..       Review / Management   Radiology results   Ultrasound, Complete pelvic ultrasound is done.  Pelvic ultrasound finds the uterus 8.5 cm in length by 4 x 6 cm in horizontal and vertical dimensions.  The ovaries are normal bilaterally.  There are two simple follicles on the left.  There is no fluid in the cul-de-sac.  The uterus is fairly sharply retroverted.       Impression and Plan   Diagnosis     Excessive and frequent menstruation with regular cycle (DAD03-XG N92.0).     Menorrhagia with dysmenorrhea.  The patient is requesting hysterectomy..     Plan:  We did discuss ablation and its pros and cons.  We discussed further medical therapy and hysterectomy.  The patient is very much interested in hysterectomy and wishes to go ahead with that in the near future.  She will check her work scheduled and get back to us regarding scheduling of this procedure.  She will see Dr. Snowden for preoperative history and physical..    Patient Instructions:        Counseled: Patient, Regarding diagnosis, Regarding treatment, Regarding medications, Verbalized understanding.

## 2022-02-15 NOTE — TELEPHONE ENCOUNTER
---------------------  From: Zohreh Ferrer   To: Claire Moore MD;     Sent: 3/27/2021 9:45:20 AM CDT  Subject: Result: COVID-19     Spoke with patient at 9:44am regarding COVID-19 test.  Result is Negative.  She is feeling better on the antibiotic.  Copy of result left at  for her to  per her request.   Patient has no questions or concerns.

## 2022-02-15 NOTE — PROGRESS NOTES
Patient:   CAMRYN STEIN            MRN: [id number]            FIN: [id number]               Age:   45 years     Sex:  Female     :  1975   Associated Diagnoses:   Encounter for screening for COVID-19; Acute maxillary sinusitis   Author:   Radu Cardoso PA-C      Visit Information      Date of Service: 2021 12:26 pm  Performing Location: [facility]  Encounter#: [id number]      Primary Care Provider (PCP):  Sergei Correa PA-C    NPI# [id number]   Visit type:  Telephone Encounter.    Source of history:  Patient.    Location of patient:  _home  Call Start Time:   _1305  Call End Time:    _1311      Chief Complaint   2021 12:28 PM CST    Verbal consent given for a telephone visit. Pt is c/o sinus pressure,nausea,fatigue,runny nose,sore throat and diarrhea x week.   _      History of Present Illness   Today's visit was conducted via telephone due to the COVID-19 pandemic. Patient's consent to telephone visit was obtained and documented.      Reason for visit:  _ 1 week hx of sinus pressure, nausea, fatigue, runny nose, sore throat, diarrhea  using tylenol         Review of Systems   Constitutional:  Fatigue.    Ear/Nose/Mouth/Throat:  Nasal congestion, Sore throat, sinus pressure.    Respiratory:  No shortness of breath, No cough.    Gastrointestinal:  Nausea, Diarrhea.       Impression and Plan   Diagnosis     Encounter for screening for COVID-19 (TXB55-XR Z11.52).     Acute maxillary sinusitis (PGA15-ZZ J01.00).     Plan:  will treat as sinusitis with zpak, add expectorants/decongestant  will also get Covid test today  advised to isolate for 7 days, and have 3 days without sxs before exiting isolation.    Orders     Orders   Pharmacy:  Zithromax Z-Sohail 250 mg oral tablet (Prescribe): Take 2 tablets on Day 1 and then 1 tablet, Oral, daily, Instructions: until finished, # 6 tab(s), 0 Refill(s), Type: Maintenance, Pharmacy: [facility]  [place], Take 2 tablets on Day 1 and then 1 tablet Oral daily,Instr:until finished, 60, in, 3/13/2020 3:55 PM CDT, Height Measured, 143, lb, 3/13/2020 3:55 PM CDT, Weight Measured.     Orders   Requests (Lab):  SARS-CoV-2 RNA (COVID-19), Qualitative NAAT (Request) (Order): Encounter for screening for COVID-19.     Orders   Charges (Evaluation and Management):  21710 physician telephone evaluation 5-10 min (Charge) (Order): Quantity: 1, Encounter for screening for COVID-19  Acute maxillary sinusitis.        Health Status   Allergies:    Allergic Reactions (Selected)  Severity Not Documented  Amoxicillin (No reactions were documented)  Doxycycline (Rash, itchy)  Penicillin (No reactions were documented)  Sulfa drug (No reactions were documented)   Medications:  (Selected)   Prescriptions  Prescribed  fluticasone 50 mcg/inh nasal spray: See Instructions, Instructions: ****PT NEEDS NEW RX***************, # 1 gm, Type: Soft Stop, Pharmacy: [facility] , ****PT NEEDS NEW RX***************  levocetirizine 5 mg oral tablet: See Instructions, Instructions: ***********PT REQUESTING NEW RX***********, # 1 tab(s), Type: Soft Stop, Pharmacy: [facility] , ***********PT REQUESTING NEW RX***********   Problem list:    All Problems  Depression, Recurrent / ICD-9-.30 / Confirmed  Tobacco user / ICD-9-.1 / Confirmed  LGSIL on Pap Smear / ICD-9-.03 / Confirmed  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  Comments:  3/18/2013 CDT 12:14 PM CDT - Marlene WILSON, [place]  Colposcopy neg. OK to re-pap one year.  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  Depression, Recurrent (296.30)  Tobacco user (305.1)  Family History of Thyroid Disease  (V18.19)  Resolved  Chicken Pox (052.9): Onset in 1983 at 7 years.  Resolved.  Asthma (493.90):  Resolved.  Seasonal Allergies (477.9):  Resolved.  Pregnancy (624526250):  Resolved in 1995 at 19 years.   Family History:    Diabetes mellitus  Mother  Hypertension  Father  Depression  Mother  Aunt     Procedure history:    Vaginal hysterectomy ([id number]) on 2/17/2017 at 42 Years.  LEEP ([id number]) on 5/10/2004 at 29 Years.  bunionectomy with osteotomy base of left foot in 2004 at 29 Years.  Colposcopy ([id number]).  Comments:  10/19/2012 1:18 PM CDT - Carleen Washington  '93, '95, '04   Social History:        Electronic Cigarette/Vaping Assessment            Electronic Cigarette Use: Never.      Alcohol Assessment: Denies Alcohol Use                     Comments:                      02/22/2017 - Yissel Gallagher LPN                     Denies alcohol use      Tobacco Assessment: Current            5-9 cigarettes (between 1/4 to 1/2 pack)/day in last 30 days                     Comments:                      02/14/2013 - Staci Lisa CMA                     5 per day      Substance Abuse Assessment: Denies Substance Abuse      Employment and Education Assessment            Work/School description: .      Home and Environment Assessment            Marital status: .  Spouse/Partner name: Ramesh.      Exercise and Physical Activity Assessment: Does not exercise            Exercise frequency: Never.      Sexual Assessment            Sexually active: Yes.      Other Assessment            First menses age 12.  Regular menses.  Menstrual duration 3-5 days.  Cycle interval 28-30 days.  History of               abnormal Pap smear.

## 2022-02-15 NOTE — PROGRESS NOTES
Patient:   CAMRYN STEIN            MRN: [id number]            FIN: [id number]               Age:   44 years     Sex:  Female     :  1975   Associated Diagnoses:   Abdominal pain   Author:   Sergei Correa PA-C   Diagnosis  Abdominal pain (ONN91-RF R10.9).  Patient InstructionsOrders   Visit Information   Visit type:  General concerns.    Accompanied by:  No one.    Source of history:  Self, Medical record.    Referral source:  Self.    History limitation:  None.       Chief Complaint   2020 3:38 PM CST     c/o pain in upper back that comes through to sternum that comes and goes x 1 year; has thrown up from pain and fatigue      History of Present Illness             The patient presents with upper abdominal pain.  The upper abdominal pain is Epigastric and RUQ.  The upper abdominal pain is described as cramping and sharp.  The severity of the upper abdominal pain is moderate.  The upper abdominal pain is episodic.  The upper abdominal pain has lasted for 13 month(s).  The context of the upper abdominal pain: occurred not after alcohol intake, not after eating certain foods, not after exertion, not after trauma and not in association with illness.  Pain in epigastrium and RUQ intermittently for past 13 months or so. No weight loss. Slight nausea with episodes. No fever. No urinary symptoms. Pain can be in middle of back as well. Most recently, had episode on Monday after work, lasting about 3-4 hours. Not related to meals. Rare dysphagia of solid food. No bowel change. Has taken omeprazole in past with no response. No tobacco, rare ETOH, rare caffeine. Mylanta can help. Was into ED in 2019. Reports CT, US Normal. Cardiac workup negative. No fatty food intolerance. CC above noted and confirmed with the patient..        Review of Systems   Constitutional:  Negative.    Eye:  Negative.    Ear/Nose/Mouth/Throat:  Negative.    Respiratory:  Negative.    Cardiovascular:  Negative.    Gastrointestinal:   Negative except as documented in history of present illness.       Health Status   Allergies:    Allergic Reactions (Selected)  Severity Not Documented  Amoxicillin (No reactions were documented)  Doxycycline (Rash, itchy)  Penicillin (No reactions were documented)  Sulfa drug (No reactions were documented)   Medications:  (Selected)   Prescriptions  Prescribed  Protonix 40 mg oral delayed release tablet: = 1 tab(s) ( 40 mg ), Oral, daily, # 14 tab(s), 0 Refill(s), Type: Maintenance, Pharmacy: [facility] , 1 tab(s) Oral daily,x14 day(s)   Problem list:    All Problems (Selected)  Tobacco user / ICD-9-.1 / Confirmed  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  Comments:  3/18/2013 CDT 12:14 PM CDT - Marlene WILSON, N[place]  Colposcopy neg. OK to re-pap one year.  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  Depression, Recurrent (296.30)  Tobacco user (305.1)  Family History of Thyroid Disease (V18.19)  Resolved  Chicken Pox (052.9): Onset in 1983 at 7 years.  Resolved.  Asthma (493.90):  Resolved.  Seasonal Allergies (477.9):  Resolved.  Pregnancy (109205127):  Resolved in 1995 at 19 years.   Procedure history:    Vaginal hysterectomy ([id number]) on 2/17/2017 at 42 Years.  LEEP ([id number]) on 5/10/2004 at 29 Years.  bunionectomy with osteotomy base of left foot in 2004 at 29 Years.  Colposcopy ([id number]).  Comments:  10/19/2012 1:18 PM CDT - Carleen Washington  '93, '95, '04      Physical Examination   Vital Signs   1/8/2020 3:38 PM CST Temperature Tympanic 97.4 DegF  LOW    Peripheral Pulse Rate 74 bpm    HR Method Electronic    Systolic Blood Pressure 116 mmHg     Diastolic Blood Pressure 60 mmHg    Mean Arterial Pressure 79 mmHg    BP Site Left arm    BP Method Manual    Oxygen Saturation 98 %      Measurements from flowsheet : Measurements   1/8/2020 3:38 PM CST Height Measured - Standard 60 in    Weight Measured - Standard 142.6 lb    BSA 1.65 m2    Body Mass Index 27.85 kg/m2  HI      General:  Alert and oriented, No acute distress.    Eye:  Pupils are equal, round and reactive to light, Extraocular movements are intact, Normal conjunctiva.    HENT:  Normocephalic, Tympanic membranes are clear, Oral mucosa is moist, No pharyngeal erythema.    Neck:  Supple, Non-tender, No lymphadenopathy.    Respiratory:  Lungs are clear to auscultation, Respirations are non-labored.    Cardiovascular:  Normal rate, Regular rhythm.    Gastrointestinal:  Soft, Non-tender, Non-distended, Normal bowel sounds, No organomegaly.    Genitourinary:  No costovertebral angle tenderness.    Integumentary:  No rash.       Impression and Plan   Diagnosis     Abdominal pain (DLI92-PC R10.9).     Patient Instructions:       Counseled: Patient, Regarding medications, Diet, Activity, Verbalized understanding.    Orders     Orders (Selected)   Outpatient Orders  Ordered  NM Hepatobiliary w/ CCK (Request): Priority: Routine, Abdominal pain  Ordered (In Transit)  Amylase* ([facility]): Specimen Type: Serum, Collection Date: 01/08/20 16:05:00 CST  CBC (h/h, RBC, indices, WBC, Plt)* ([facility]): Specimen Type: Blood, Collection Date: 01/08/20 16:05:00 CST  Comprehensive Metabolic Panel* ([facility]): Specimen Type: Serum, Collection Date: 01/08/20 16:05:00 CST  Lipase* ([facility]): Specimen Type: Serum, Collection Date: 01/08/20 16:05:00 CST  Prescriptions  Prescribed  Protonix 40 mg oral delayed release tablet: = 1 tab(s) ( 40 mg ), Oral, daily, # 14 tab(s), 0 Refill(s), Type: Maintenance, Pharmacy: [facility] , 1 tab(s) Oral daily,x14 day(s).     If these all normal, will refer to [facility] for consult or  to Dr. Gates for consult and possible EGD. Trial of Protonix while waiting for test results.

## 2022-02-15 NOTE — TELEPHONE ENCOUNTER
---------------------  From: Sergei Correa PA-C   To: CAMRYN STEIN    Sent: 1/9/2020 10:39:07 AM CST  Subject: Patient Message - Results Notification          These are all normal. I will contact you after I have the results of your HIDA scan.  Results:  Date Result Name Value Ref Range   1/8/2020 4:15 PM Sodium Level 139 mmol/L (135 - 146)   1/8/2020 4:15 PM Potassium Level 4.2 mmol/L (3.5 - 5.3)   1/8/2020 4:15 PM Chloride Level 106 mmol/L (98 - 110)   1/8/2020 4:15 PM CO2 Level 27 mmol/L (20 - 32)   1/8/2020 4:15 PM Glucose Level 96 mg/dL (65 - 99)   1/8/2020 4:15 PM BUN 14 mg/dL (7 - 25)   1/8/2020 4:15 PM Creatinine Level 0.71 mg/dL (0.50 - 1.10)   1/8/2020 4:15 PM Calcium Level 9.5 mg/dL (8.6 - 10.2)   1/8/2020 4:15 PM Bilirubin Total 0.5 mg/dL (0.2 - 1.2)   1/8/2020 4:15 PM Alkaline Phosphatase 102 unit/L (33 - 115)   1/8/2020 4:15 PM AST/SGOT 17 unit/L (10 - 30)   1/8/2020 4:15 PM ALT/SGPT 18 unit/L (6 - 29)   1/8/2020 4:15 PM Protein Total 7.1 gm/dL (6.1 - 8.1)   1/8/2020 4:15 PM Albumin Level 4.4 gm/dL (3.6 - 5.1)   1/8/2020 4:15 PM Globulin 2.7 (1.9 - 3.7)   1/8/2020 4:15 PM A/G Ratio 1.6 (1.0 - 2.5)   1/8/2020 4:15 PM Amylase Level 52 unit/L (21 - 101)   1/8/2020 4:15 PM Lipase Level 13 unit/L (7 - 60)   1/8/2020 4:15 PM WBC 6.3 (3.8 - 10.8)   1/8/2020 4:15 PM RBC 4.71 (3.80 - 5.10)   1/8/2020 4:15 PM Hgb 12.9 gm/dL (11.7 - 15.5)   1/8/2020 4:15 PM Hct 38.6 % (35.0 - 45.0)   1/8/2020 4:15 PM MCV 82.0 fL (80.0 - 100.0)   1/8/2020 4:15 PM MCH 27.4 pg (27.0 - 33.0)   1/8/2020 4:15 PM MCHC 33.4 gm/dL (32.0 - 36.0)   1/8/2020 4:15 PM RDW 12.7 % (11.0 - 15.0)   1/8/2020 4:15 PM Platelet 378 (140 - 400)   1/8/2020 4:15 PM MPV 9.9 fL (7.5 - 12.5)

## 2022-02-15 NOTE — TELEPHONE ENCOUNTER
---------------------  From: Senait Melara CMA (eRx Pool ([id number]_[place]))   To: Sergei Correa PA-C;     Sent: 4/13/2020 9:21:09 AM CDT  Subject: FW: Medication Management   Due Date/Time: 4/12/2020 12:48:00 PM CDT             ** Patient matched by Senait Melara CMA on 4/13/2020 9:21:02 AM CDT **      ------------------------------------------  From: [employer]  To: Sergei Correa PA-C  Sent: April 11, 2020 12:48:57 PM CDT  Subject: Medication Management  Due: April 12, 2020 12:48:57 PM CDT    ** On Hold Pending Signature **  Drug: omeprazole (omeprazole 20 mg oral delayed release capsule)  **********PT REQUESTING NEW RX*************  Quantity: 1 cap(s)  Days Supply: 1  Refills: 0  Substitutions Allowed  Notes from Pharmacy:     Dispensed Drug: omeprazole (omeprazole 20 mg oral delayed release capsule)  **********PT REQUESTING NEW RX*************  Quantity: 1 cap(s)  Days Supply: 1  Refills: 0  Substitutions Allowed  Notes from Pharmacy:   ---------------------------------------------------------------  From: Sergei Correa PA-C   To: [employer]    Sent: 4/13/2020 9:30:40 AM CDT  Subject: FW: Medication Management     ** Approved **  omeprazole (omeprazole 20 mg capsule,delayed release)  **********PT REQUESTING NEW RX*************  Qty:  1 cap(s)        Days Supply:  1        Refills:  0          Substitutions Allowed     Route To Pharmacy - [facility]    Impression: Pinguecula, bilateral: H11.153. Plan: Recommend UV protection when outdoors and lubrication. Will continue to monitor for any changes annually.

## 2022-02-15 NOTE — TELEPHONE ENCOUNTER
---------------------  From: Shana Fernandez (Phone Messages Pool ([id number]_[facility]))   To: [employer] Message Pool ([id number]_[place]);     Sent: 9/13/2021 11:45:01 AM CDT  Subject: General Message     Call ayesha pt at 1143 f/u on her FMLA ppw. Patient states that [facility] said that she was going to fill it out and fax back last week and this has not been done yet. Please advise on status and call pt to let her know. [phone] ok [facility]---------------------  From: Elgin Dockery CMA ([facility] Message Pool ([id number]_[facility]))   To: [place] MARQUEZ, Roula BAUMANN;     Sent: 9/15/2021 2:36:28 PM CDT  Subject: FW: General Message

## 2022-02-15 NOTE — TELEPHONE ENCOUNTER
Called with HIDA scan. Abnormal gallbladder ejection fraction. Symptoms intermittent.  Wants to be seen by general surgery in [place]. Referral made.    KAH

## 2022-02-15 NOTE — TELEPHONE ENCOUNTER
---------------------  From: Fatoumata Ramirez LPN (Phone Messages Pool ([id number]_[facility]))   To: Phone Messages Pool ([id number]_[facility]);     Sent: 3/25/2020 12:03:17 PM CDT  Subject: more information needed     Phone Message    PCP:   KAH      Time of Call:  11:56am       Person Calling:  Nuha- Radiology Scheduling  Phone number:  [phone]    Note:   Nuha LM stating she is needing to get some mammogram films or some more details on pt regarding the order that was sent over by BR[facility]. Nuha says they are needing that information for pt's diagnostic mammogram.    Last office visit and reason:  3/13/20 CHT Pharyngitis---------------------  From: Meena Pratt MA (Phone Messages Pool ([id number]_[facility]))   To: Sergei Correa PA-C;     Sent: 3/25/2020 12:54:36 PM CDT  Subject: FW: more information needed---------------------  From: Sergei Correa PA-C   To: Phone [employer] ([id number]_[facility]);     Sent: 3/25/2020 12:59:49 PM CDT  Subject: RE: more information needed     Please fax the mammo report    BROOK[facility]NNfaxed report

## 2022-02-15 NOTE — NURSING NOTE
Comprehensive Intake Entered On:  1/8/2020 3:43 PM CST    Performed On:  1/8/2020 3:38 PM CST by Senait Melara CMA               Summary   Chief Complaint :   c/o pain in upper back that comes through to sternum that comes and goes x 1 year; has thrown up from pain and fatigue   Menstrual Status :   Menarcheal   Weight Measured :   142.6 lb(Converted to: 142 lb 10 oz, 64.68 kg)    Height Measured :   60 in(Converted to: 5 ft 0 in, 152.40 cm)    Body Mass Index :   27.85 kg/m2 (HI)    Body Surface Area :   1.65 m2   Systolic Blood Pressure :   116 mmHg   Diastolic Blood Pressure :   60 mmHg   Mean Arterial Pressure :   79 mmHg   Peripheral Pulse Rate :   74 bpm   BP Site :   Left arm   BP Method :   Manual   HR Method :   Electronic   Temperature Tympanic :   97.4 DegF(Converted to: 36.3 DegC)  (LOW)    Oxygen Saturation :   98 %   Race :      Languages :   English   Ethnicity :   Not  or    Senait Melara CMA - 1/8/2020 3:38 PM CST   Health Status   Allergies Verified? :   Yes   Medication History Verified? :   Yes   Immunizations Current :   Yes   Medical History Verified? :   Yes   Pre-Visit Planning Status :   Completed   Tobacco Use? :   Former smoker   Senait Melara CMA - 1/8/2020 3:38 PM CST   Consents   Consent for Immunization Exchange :   Consent Granted   Consent for Immunizations to Providers :   Consent Granted   Senait Melara CMA - 1/8/2020 3:38 PM CST   Meds / Allergies   (As Of: 1/8/2020 3:43:07 PM CST)   Allergies (Active)   amoxicillin  Estimated Onset Date:   Unspecified ; Created By:   Mili Olivera; Reaction Status:   Active ; Substance:   amoxicillin ; Updated By:   Mili Olivera; Reviewed Date:   1/8/2020 3:42 PM CST      doxycycline  Estimated Onset Date:   Unspecified ; Reactions:   rash, itchy ; Created By:   Carleen Washington; Reaction Status:   Active ; Category:   Drug ; Substance:   doxycycline ; Type:   Allergy ; Updated By:   Carleen Washington; Reviewed Date:   1/8/2020  3:42 PM CST      penicillin  Estimated Onset Date:   Unspecified ; Created By:   Mili Olivera; Reaction Status:   Active ; Category:   Drug ; Substance:   penicillin ; Type:   Allergy ; Updated By:   Mili Olivera; Reviewed Date:   1/8/2020 3:42 PM CST      sulfa drug  Estimated Onset Date:   Unspecified ; Created By:   Mili Olivera; Reaction Status:   Active ; Substance:   sulfa drug ; Updated By:   Mili Olivera; Reviewed Date:   1/8/2020 3:42 PM CST        Medication List   (As Of: 1/8/2020 3:43:07 PM CST)   No Known Home Medications     Senait Melara CMA - 1/8/2020 3:43:02 PM

## 2022-02-15 NOTE — TELEPHONE ENCOUNTER
---------------------  From: Claire Moore MD   To: [facility] Message Pool ([id number]_[place]);     Sent: 3/25/2021 9:56:07 AM CDT  Subject: General Message     please help arrange a covid 19 drive up test todaycurbside test request for today---------------------  From: Madison Eastman ([employer] Message Pool ([id number]_[facility]))   To: Appointment Pool ([id number]_[place]);     Sent: 3/25/2021 10:09:36 AM CDT  Subject: FW: General Message---------------------  From: Madison Eastman ([employer] Message Pool ([id number]_[facility]))   To: Appointment Pool ([id number]_[place]);     Sent: 3/25/2021 10:09:46 AM CDT  Subject: FW: General Messagescheduledscheduled

## 2022-02-15 NOTE — NURSING NOTE
CAGE Assessment Entered On:  1/8/2020 4:18 PM CST    Performed On:  1/8/2020 4:18 PM CST by Senait Melara CMA               Assessment   Have you ever felt you should cut down on your drinking :   No   Have people annoyed you by criticizing your drinking :   No   Have you ever felt bad or guilty about your drinking :   No   Have you ever taken a drink first thing in the morning to steady your nerves or get rid of a hangover (Eye-opener) :   No   CAGE Score :   0    Senait Melara CMA - 1/8/2020 4:18 PM CST

## 2022-02-15 NOTE — PROGRESS NOTES
Patient:   CAMRYN STEIN            MRN: [id number]            FIN: [id number]               Age:   42 years     Sex:  Female     :  1975   Associated Diagnoses:   Viral pharyngitis   Author:   Jordana Snowden MD      Chief Complaint   2017 1:24 PM CDT    Pt. presents with body aches,headache,chills,sore thoat, cough. Headache started Monday, other s/s started yesterday. Refill Omeprazole.        History of Present Illness   Chief complaint and symptoms reviewed with patient and confirmed as above. Worse over past 24 hours.      Health Status   Allergies:    Allergic Reactions (All)  Severity Not Documented  Amoxicillin (No reactions were documented)  Doxycycline (Rash, itchy)  Penicillin (No reactions were documented)  Sulfa drug (No reactions were documented)  Canceled/Inactive Reactions (All)  No known allergies   Medications:  (Selected)   Prescriptions  Prescribed  FLUoxetine 20 mg oral tablet: 1 tab(s) ( 20 mg ), PO, Daily, # 30 tab(s), 1 Refill(s), Type: Maintenance, Pharmacy: [id number], 1 tab(s) po daily  meclizine 25 mg oral tablet: 1 tab(s) ( 25 mg ), PO, TID, PRN: for dizziness, # 30 tab(s), 0 Refill(s), Type: Maintenance, Pharmacy: [id number], 1 tab(s) po tid,PRN:for dizziness  omeprazole 20 mg oral delayed release capsule: 1 cap(s) ( 20 mg ), PO, Daily, # 90 cap(s), 0 Refill(s), Type: Maintenance, Pharmacy: [id number], 1 cap(s) po daily   Problem list:    All Problems (Selected)  ASCUS 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  LEEP ([id number]) on 5/10/2004 at 29 Years.  bunionectomy with osteotomy base of left foot in  at 29  Years.  Colposcopy ([id number]).  Comments:  10/19/2012 1:18 PM - Carleen Washington  '93, '95, '04      Physical Examination   Vital Signs   5/31/2017 1:24 PM CDT Temperature Tympanic 99.7 DegF    Peripheral Pulse Rate 100 bpm    Pulse Site Radial artery    HR Method Manual    Systolic Blood Pressure 104 mmHg    Diastolic Blood Pressure 66 mmHg    Mean Arterial Pressure 79 mmHg    BP Site Right arm    BP Method Manual      Measurements from flowsheet : Measurements   5/31/2017 1:24 PM CDT Height Measured - Standard 60 in    Weight Measured - Standard 144.4 lb    BSA 1.66 m2    Body Mass Index 28.2 kg/m2      General:  Alert and oriented, No acute distress.    Eye:  Pupils are equal, round and reactive to light, Normal conjunctiva.    HENT:  Normocephalic, Tympanic membranes are clear, Oral mucosa is moist, No pharyngeal erythema, No sinus tenderness.    Neck:  Supple, Non-tender, No lymphadenopathy.    Respiratory:  Lungs are clear to auscultation.    Cardiovascular:  Normal rate, Regular rhythm.       Impression and Plan   Diagnosis     Viral pharyngitis (CZT33-RE J02.9).     Plan:  Signs and symptoms and over the counter treatment of upper respiratory infection discussed.  Should resolve over next 5-7 days.  Return to clinic if severe headache, difficulty swallowing, chest pain, or if symptoms become more severe or any other concerns.  Call with questions. Strep culture is pending. Patient will be notified only of positive result..

## 2022-02-15 NOTE — NURSING NOTE
Depression Screening Entered On:  1/8/2020 4:18 PM CST    Performed On:  1/8/2020 4:18 PM CST by Senait Melara CMA               Depression Screening   Little Interest - Pleasure in Activities :   Not at all   Feeling Down, Depressed, Hopeless :   Not at all   Initial Depression Screen Score :   0    Trouble Falling or Staying Asleep :   Several days   Feeling Tired or Little Energy :   Several days   Poor Appetite or Overeating :   Several days   Feeling Bad About Yourself :   Not at all   Trouble Concentrating :   Not at all   Moving or Speaking Slowly :   Not at all   Thoughts Better Off Dead or Hurting Self :   Not at all   Detailed Depression Screen Score :   3    Total Depression Screen Score :   3    JESUS Difficulty with Work, Home, Others :   Not difficult at all   Senait Melara CMA - 1/8/2020 4:18 PM CST

## 2022-03-02 VITALS
SYSTOLIC BLOOD PRESSURE: 114 MMHG | TEMPERATURE: 97.6 F | OXYGEN SATURATION: 96 % | WEIGHT: 151 LBS | DIASTOLIC BLOOD PRESSURE: 62 MMHG | HEART RATE: 86 BPM | SYSTOLIC BLOOD PRESSURE: 112 MMHG | HEIGHT: 60 IN | BODY MASS INDEX: 29.64 KG/M2 | TEMPERATURE: 98.1 F | BODY MASS INDEX: 30.82 KG/M2 | DIASTOLIC BLOOD PRESSURE: 72 MMHG | HEART RATE: 80 BPM | RESPIRATION RATE: 18 BRPM | HEIGHT: 60 IN | WEIGHT: 157 LBS

## 2022-03-02 NOTE — NURSING NOTE
Comprehensive Intake Entered On:  1/24/2022 4:02 PM CST    Performed On:  1/24/2022 3:57 PM CST by Senait Goodwin LPN               Summary   Chief Complaint :   short term disablitly PPW   Menstrual Status :   Menarcheal   Weight Measured :   157 lb(Converted to: 157 lb 0 oz, 71.214 kg)    Height Measured :   60 in(Converted to: 5 ft 0 in, 152.40 cm)    Body Mass Index :   30.66 kg/m2 (HI)    Body Surface Area :   1.73 m2   Systolic Blood Pressure :   114 mmHg   Diastolic Blood Pressure :   62 mmHg   Mean Arterial Pressure :   79 mmHg   Peripheral Pulse Rate :   80 bpm   BP Site :   Right arm   Pulse Site :   Radial artery   BP Method :   Manual   HR Method :   Manual   Temperature Tympanic :   98.1 DegF(Converted to: 36.7 DegC)    Race :      Languages :   English   Ethnicity :   Not  or    Senait Goodwin LPN - 1/24/2022 3:57 PM CST   Health Status   Allergies Verified? :   Yes   Medication History Verified? :   Yes   Immunizations Current :   Yes   Pre-Visit Planning Status :   Completed   Tobacco Use? :   Former smoker   Senait Goodwin LPN - 1/24/2022 3:57 PM CST   Consents   Consent for Immunization Exchange :   Consent Granted   Consent for Immunizations to Providers :   Consent Granted   Senait Goodwin LPN - 1/24/2022 3:57 PM CST   Meds / Allergies   (As Of: 1/24/2022 4:02:13 PM CST)   Allergies (Active)   amoxicillin  Estimated Onset Date:   Unspecified ; Created By:   Mili Olivera CMA; Reaction Status:   Active ; Substance:   amoxicillin ; Updated By:   Mili Olivera CMA; Reviewed Date:   9/1/2021 8:53 AM CDT      doxycycline  Estimated Onset Date:   Unspecified ; Reactions:   rash, itchy ; Created By:   Carleen Washington; Reaction Status:   Active ; Category:   Drug ; Substance:   doxycycline ; Type:   Allergy ; Updated By:   Carleen Washington; Reviewed Date:   9/1/2021 8:53 AM CDT      penicillin  Estimated Onset Date:   Unspecified ; Created By:   Mili Olivera CMA; Reaction  Status:   Active ; Category:   Drug ; Substance:   penicillin ; Type:   Allergy ; Updated By:   Mili Olivera CMA; Reviewed Date:   9/1/2021 8:53 AM CDT      sulfa drug  Estimated Onset Date:   Unspecified ; Created By:   Mili Olivera CMA; Reaction Status:   Active ; Substance:   sulfa drug ; Updated By:   Mili Olivera CMA; Reviewed Date:   9/1/2021 8:53 AM CDT        Medication List   (As Of: 1/24/2022 4:02:13 PM CST)   Prescription/Discharge Order    fluticasone nasal  :   fluticasone nasal ; Status:   Prescribed ; Ordered As Mnemonic:   fluticasone 50 mcg/inh nasal spray ; Simple Display Line:   See Instructions, 1 spray(s)  in each nostril daily, 16 gm, 8 Refill(s) ; Ordering Provider:   Orin Carlson; Catalog Code:   fluticasone nasal ; Order Dt/Tm:   9/1/2021 9:13:19 AM CDT          levocetirizine  :   levocetirizine ; Status:   Prescribed ; Ordered As Mnemonic:   levocetirizine 5 mg oral tablet ; Simple Display Line:   5 mg, 1 tab(s), Oral, qpm, 90 tab(s), 3 Refill(s) ; Ordering Provider:   Orin Carlson; Catalog Code:   levocetirizine ; Order Dt/Tm:   9/1/2021 9:16:58 AM CDT          methylPREDNISolone  :   methylPREDNISolone ; Status:   Completed ; Ordered As Mnemonic:   Medrol Dosepak 4 mg oral tablet ; Simple Display Line:   1 packet(s), Oral, once, as directed on package labeling, 21 tab(s), 0 Refill(s) ; Ordering Provider:   Sergei Correa PA-C; Catalog Code:   methylPREDNISolone ; Order Dt/Tm:   1/4/2022 11:26:15 AM CST            Home Meds    pantoprazole  :   pantoprazole ; Status:   Documented ; Ordered As Mnemonic:   Protonix 20 mg oral delayed release tablet ; Simple Display Line:   40 mg, 2 tab(s), Oral, daily, 60 tab(s), 0 Refill(s) ; Catalog Code:   pantoprazole ; Order Dt/Tm:   8/29/2021 8:37:40 AM CDT

## 2022-03-02 NOTE — TELEPHONE ENCOUNTER
---------------------  From: Sergei Correa PA-C   To: CAMRYN STEIN    Sent: 1/25/2022 10:17:44 AM CST  Subject: General Message     These are fine. Let me know if your symptoms persist.      Results:  Date Result Name Ind Value Ref Range   1/24/2022 4:25 PM Glucose Level  75 mg/dL (65 - 99)   1/24/2022 4:25 PM BUN  16 mg/dL (7 - 25)   1/24/2022 4:25 PM Creatinine Level  0.71 mg/dL (0.50 - 1.10)   1/24/2022 4:25 PM eGFR  102 mL/min/1.73m2 (> OR = 60 - )   1/24/2022 4:25 PM eGFR African American  118 mL/min/1.73m2 (> OR = 60 - )   1/24/2022 4:25 PM BUN/Creat Ratio  NOT APPLICABLE (6 - 22)   1/24/2022 4:25 PM Sodium Level  140 mmol/L (135 - 146)   1/24/2022 4:25 PM Potassium Level  4.1 mmol/L (3.5 - 5.3)   1/24/2022 4:25 PM Chloride Level  106 mmol/L (98 - 110)   1/24/2022 4:25 PM CO2 Level  25 mmol/L (20 - 32)   1/24/2022 4:25 PM Calcium Level  9.4 mg/dL (8.6 - 10.2)   1/24/2022 4:25 PM UA Color  YELLOW (YELLOW - )   1/24/2022 4:25 PM UA Appear  CLEAR (CLEAR - )   1/24/2022 4:25 PM UA Specific [place]  1.025 (1.001 - 1.035)   1/24/2022 4:25 PM UA pH  6.0 (5.0 - 8.0)   1/24/2022 4:25 PM UA Glucose  NEGATIVE (NEGATIVE - NEGATIVE)   1/24/2022 4:25 PM UA Bilirubin  NEGATIVE (NEGATIVE - NEGATIVE)   1/24/2022 4:25 PM UA Ketones ((A)) TRACE (NEGATIVE - NEGATIVE)   1/24/2022 4:25 PM UA Blood  NEGATIVE (NEGATIVE - NEGATIVE)   1/24/2022 4:25 PM UA Protein  NEGATIVE (NEGATIVE - NEGATIVE)   1/24/2022 4:25 PM UA Nitrite  NEGATIVE (NEGATIVE - NEGATIVE)   1/24/2022 4:25 PM UA Leukocyte Esterase  NEGATIVE (NEGATIVE - NEGATIVE)   1/24/2022 4:25 PM UA WBC  NONE SEEN /HPF ( - < OR = 5)   1/24/2022 4:25 PM UA RBC  NONE SEEN /HPF ( - < OR = 2)   1/24/2022 4:25 PM UA Squamous Epithelial Cells  NONE SEEN /HPF ( - < OR = 5)   1/24/2022 4:25 PM UA Bacteria  NONE SEEN /HPF (NONE SEEN - )   1/24/2022 4:25 PM UA Hyaline Cast  NONE SEEN /LPF (NONE SEEN - )   1/24/2022 4:25 PM TSH  1.37 mIU/L

## 2022-03-02 NOTE — TELEPHONE ENCOUNTER
---------------------  From: Radha Hernandez CMA (Phone Messages Pool ([id number])   To: Sergei Correa PA-C;     Sent: 1/30/2022 11:16:11 AM CST  Subject: FW: General Message     Recent COVID positive 1/4/22. Please advise.       ---------------------  From: CAMRYN STEIN  To: [facility]  Sent: 01/30/2022 11:08 a.m. CST  Subject: FW: General Message  This message is for Dr. Stanton. On Thursday Jan. 27th I developed another sinus infection I think. My throat swelled up and had a hard time swallowing and it was sore and my sinuses are congested and now I can t hear out of my right ear. Is there anything I can do or take to clear this up?  ---------------------  From: Sergei Correa PA-C  To: CAMRYN STEIN  Sent: 1/25/2022 10:17:44 AM CST  Subject: General Message       These are fine. Let me know if your symptoms persist.            Results:  Date Result Name Ind Value Ref Range   1/24/2022 4:25 PM Glucose Level     75 mg/dL (65 - 99)   1/24/2022 4:25 PM BUN     16 mg/dL (7 - 25)   1/24/2022 4:25 PM Creatinine Level     0.71 mg/dL (0.50 - 1.10)   1/24/2022 4:25 PM eGFR     102 mL/min/1.73m2 (> OR = 60 - )   1/24/2022 4:25 PM eGFR African American     118 mL/min/1.73m2 (> OR = 60 - )   1/24/2022 4:25 PM BUN/Creat Ratio     NOT APPLICABLE (6 - 22)   1/24/2022 4:25 PM Sodium Level     140 mmol/L (135 - 146)   1/24/2022 4:25 PM Potassium Level     4.1 mmol/L (3.5 - 5.3)   1/24/2022 4:25 PM Chloride Level     106 mmol/L (98 - 110)   1/24/2022 4:25 PM CO2 Level     25 mmol/L (20 - 32)   1/24/2022 4:25 PM Calcium Level     9.4 mg/dL (8.6 - 10.2)   1/24/2022 4:25 PM UA Color     YELLOW (YELLOW - )   1/24/2022 4:25 PM UA Appear     CLEAR (CLEAR - )   1/24/2022 4:25 PM UA Specific [place]     1.025 (1.001 - 1.035)   1/24/2022 4:25 PM UA pH     6.0 (5.0 - 8.0)   1/24/2022 4:25 PM UA Glucose     NEGATIVE (NEGATIVE - NEGATIVE)   1/24/2022 4:25 PM UA Bilirubin     NEGATIVE (NEGATIVE -  NEGATIVE)   1/24/2022 4:25 PM UA Ketones ((A)) TRACE (NEGATIVE - NEGATIVE)   1/24/2022 4:25 PM UA Blood     NEGATIVE (NEGATIVE - NEGATIVE)   1/24/2022 4:25 PM UA Protein     NEGATIVE (NEGATIVE - NEGATIVE)   1/24/2022 4:25 PM UA Nitrite     NEGATIVE (NEGATIVE - NEGATIVE)   1/24/2022 4:25 PM UA Leukocyte Esterase     NEGATIVE (NEGATIVE - NEGATIVE)   1/24/2022 4:25 PM UA WBC     NONE SEEN /HPF ( - < OR = 5)   1/24/2022 4:25 PM UA RBC     NONE SEEN /HPF ( - < OR = 2)   1/24/2022 4:25 PM UA Squamous Epithelial Cells     NONE SEEN /HPF ( - < OR = 5)   1/24/2022 4:25 PM UA Bacteria     NONE SEEN /HPF (NONE SEEN - )   1/24/2022 4:25 PM UA Hyaline Cast     NONE SEEN /LPF (NONE SEEN - )   1/24/2022 4:25 PM TSH     1.37 mIU/L---------------------  From: Sergei Correa PA-C   To: Phone Messages [facility] ([id number]);     Sent: 1/31/2022 10:02:43 AM CST  Subject: RE: General Message     zithromax 500 mg once daily x five days. Exam if not improved, or if worsening.    KAH---------------------  From: Liz Garcia RN (Phone Messages Pool ([id number]_[facility]))   To: LIBERTY STEIN    Sent: 1/31/2022 12:25:52 PM CST  Subject: FW: General Message     Liberty Beauchamp,     An prescription for Zithromax has been sent in for your sinus infection. Once finished with antibiotics and if your symptoms are not improved or have worsened, you will need to come in for a visit.    Thank you,  Liz GIRALDO RNPatient calls at [phone] stating that she went to pharmacy and tried to get her medication but it has not been sent in. Patient requests this be sent to [facility]. I resent this

## 2022-03-02 NOTE — PROGRESS NOTES
Patient:   CAMRYN STEIN            MRN: [id number]            FIN: [id number]               Age:   46 years     Sex:  Female     :  1975   Associated Diagnoses:   Neck pain; Sinusitis, acute ethmoidal; Seasonal Allergies   Author:   Sergei Correa PA-C      Visit Information      Date of Service: 2022 10:58 am  Performing Location: [facility]  Encounter#: [id number]      Primary Care Provider (PCP):  Sergei Correa PA-C    NPI# [id number]      Referring Provider:  Sergei Correa PA-C    NPI# [id number]   Visit type:  New symptom.    Source of history:  Self.    Referral source:  Self.    History limitation:  None.       Chief Complaint   2022 11:02 AM CST    sinus pressure for the past few weeks, does get bloody nose when blowing it, headache for the past few days.      History of Present Illness             The patient presents with sinus problem.  The sinus problem is located in the maxillary sinus.  The sinus problem is characterized by nasal congestion, rhinorrhea and facial pain.  The severity of the sinus problem is moderate.  The sinus problem is episodic, fluctuates in intensity and is worsening.  The sinus problem has lasted for 2 month(s).  The context of the sinus problem: occurred in association with illness.  Associated symptoms consist of cough and sore throat.  CC above noted and confirmed with the patient..  Sharp stab in occiput as of late. No paresthesias or weakness. No fever. Remote exposure to Covid. Had Ceftin in Sept with partial response. CC above noted and confirmed with the patient..        Review of Systems   Constitutional:  Negative except as documented in history of present illness.    Eye:  Negative.    Ear/Nose/Mouth/Throat:  Negative except as documented in history of present illness.    Respiratory:  Negative except as documented in history of present illness.    Neurologic:  Negative except as documented in history of present illness.        Health Status   Allergies:    Allergic Reactions (All)  Severity Not Documented  Amoxicillin (No reactions were documented)  Doxycycline (Rash, itchy)  Penicillin (No reactions were documented)  Sulfa drug (No reactions were documented)  Canceled/Inactive Reactions (All)  No known allergies   Medications:  (Selected)   Prescriptions  Prescribed  fluticasone 50 mcg/inh nasal spray: See Instructions, Instructions: 1 spray(s)  in each nostril daily, # 16 gm, 8 Refill(s), Type: Soft Stop, Pharmacy: [facility], 1 spray(s); in each nostril daily, 60, in, 09/01/21 8:50:00 CDT...  levocetirizine 5 mg oral tablet: = 1 tab(s) ( 5 mg ), Oral, qpm, # 90 tab(s), 3 Refill(s), Type: Soft Stop, Pharmacy: [facility], 1 tab(s) Oral qpm, 60, in, 09/01/21 8:50:00 CDT, Height Measured, 155.2, lb, 09/01/21 8:50:00...  Documented Medications  Documented  Protonix 20 mg oral delayed release tablet: = 2 tab(s) ( 40 mg ), Oral, daily, # 60 tab(s), 0 Refill(s), Type: Maintenance,    Medications          *denotes recorded medication          fluticasone 50 mcg/inh nasal spray: See Instructions, 1 spray(s)  in each nostril daily, 16 gm, 8 Refill(s).          levocetirizine 5 mg oral tablet: 5 mg, 1 tab(s), Oral, qpm, 90 tab(s), 3 Refill(s).          *Protonix 20 mg oral delayed release tablet: 40 mg, 2 tab(s), Oral, daily, 60 tab(s), 0 Refill(s).       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  Comments:  3/18/2013 CDT 12:14 PM CDT - Nubia Chang  Colposcopy neg. OK to re-pap one year.  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  Depression, Recurrent (296.30)  Family History of Thyroid Disease (V18.19)  Resolved  Chicken Pox (052.9): Onset in 1983 at 7 years.  Resolved.  Asthma (493.90):  Resolved.  Seasonal Allergies (477.9):  Resolved.  Tobacco user (305.1):  Resolved.  Pregnancy (997367387):  Resolved in 1995 at 19 years.   Family History:    Diabetes mellitus  Mother  Hypertension  Father  Depression  Mother  Aunt     Procedure history:    Vaginal hysterectomy (329825521) on 2/17/2017 at 42 Years.  LEEP (25158219) on 5/10/2004 at 29 Years.  bunionectomy with osteotomy base of left foot in 2004 at 29 Years.  Colposcopy ([id number]).  Comments:  10/19/2012 1:18 PM CDT - Carleen Washington  '93, '95, '04   Social History:        Electronic Cigarette/Vaping Assessment            Electronic Cigarette Use: Never.      Alcohol Assessment: Denies Alcohol Use                     Comments:                      02/22/2017 - Yissel Gallagher LPN                     Denies alcohol use      Tobacco Assessment: Current            5-9 cigarettes (between 1/4 to 1/2 pack)/day in last 30 days                     Comments:                      02/14/2013 - Staci Lisa CMA                     5 per day      Substance Abuse Assessment: Denies Substance Abuse      Employment and Education Assessment            Work/School description: .      Home and Environment Assessment            Marital status: .  Spouse/Partner name: Ramesh.      Exercise and Physical Activity Assessment: Does not exercise            Exercise frequency: Never.       Sexual Assessment            Sexually active: Yes.      Other Assessment            First menses age 12.  Regular menses.  Menstrual duration 3-5 days.  Cycle interval 28-30 days.  History of               abnormal Pap smear.        Physical Examination   Vital Signs   1/4/2022 11:02 AM CST Temperature Tympanic 97.6 DegF  LOW    Peripheral Pulse Rate 86 bpm    Pulse Site Radial artery    HR Method Manual    Respiratory Rate 18 br/min    Systolic Blood Pressure 112 mmHg    Diastolic Blood Pressure 72 mmHg    Mean Arterial Pressure 85 mmHg    BP Site Right arm    BP Method Manual    Oxygen Saturation 96 %      Measurements from flowsheet : Measurements   1/4/2022 11:02 AM CST Height Measured - Standard 60 in    Weight Measured - Standard 151 lb    BSA 1.7 m2    Body Mass Index 29.49 kg/m2  HI      General:  Alert and oriented, No acute distress.    Eye:  Pupils are equal, round and reactive to light, Extraocular movements are intact, Normal conjunctiva.    HENT:  Normocephalic, Tympanic membranes are clear, Oral mucosa is moist.         Nose: Both nostrils, Discharge ( Small amount, Green ).         Sinus: Bilateral, Ethmoid sinus, Tenderness.         Throat: Pharynx ( Erythematous ).    Neck:  Supple, Non-tender, No lymphadenopathy.    Respiratory:  Lungs are clear to auscultation, Respirations are non-labored.    Cardiovascular:  Normal rate, Regular rhythm, No murmur.    Musculoskeletal:  Normal range of motion.    Integumentary:  No rash.    Neurologic:  No focal deficits, Normal deep tendon reflexes.       Impression and Plan   Diagnosis     Neck pain (JLV67-RC M54.2).     Sinusitis, acute ethmoidal (UXP43-PC J01.20).     Seasonal Allergies (NWF09-RM J30.2).     Patient Instructions:       Counseled: Patient, Regarding diagnosis, Regarding medications, Activity, Verbalized understanding.    Orders     Orders   Pharmacy:  cefdinir 300 mg oral capsule (Prescribe): = 1 cap(s) ( 300 mg ), PO, q12hr, x 14 day(s), # 28  cap(s), 0 Refill(s), Type: Acute, Pharmacy: [facility], 1 cap(s) Oral q12 hrs,x14 day(s), 60, in, 1/4/2022 11:02 AM CST, Height Measured, 151, lb, 1/4/2022 11:02 AM CST, Weight Measured  Medrol Dosepak 4 mg oral tablet (Prescribe): = 1 packet(s), Oral, once, Instructions: as directed on package labeling, # 21 tab(s), 0 Refill(s), Type: Soft Stop, Pharmacy: [facility], 1 packet(s) Oral once,Instr:as directed on package labeling, 60, in, 1/4/2022 11:02 AM CST, Height Measured, 151, lb, 1/4/2022 11:02 AM CST, Weight Measured.     Take medicine as prescribed, side effects discussed.  Tylenol/ibuprofen for fever and discomfort.  Push fluids.  RTC if not improving in 36-48 hours, prior if concerns as we have discussed.     Plan:  If HAs persist, will consider cervical spine x-ray and or CT sinuses..    will test for C-19

## 2022-03-02 NOTE — TELEPHONE ENCOUNTER
---------------------  From: Tiffanie Huang CMA   To: Phone Messages Pool ([id number]);     Sent: 1/5/2022 3:04:44 PM CST  Subject: General Message-positive covid results     Called patient to notify of positive COVID testing result.  LM for a return call at [phone]     Let patient know that public health will be notified as mandated by the state and patient may be contacted by public health directly as staffing allows.  Patient was offered a virtual visit to discuss any concerns. This is strongly encouraged for anyone who might qualify for further treatment such as monoclonal antibody treatment.    Patient accepted and was transferred to scheduling.     Patient declined a virtual visit  Patient was informed of the following  1. Patient should begin isolation. No work, no school, no leaving home except to seek medical attention. Should also separate from household contacts as much as possible including using a separate bathroom if able. Isolation lasts 5 days since symptoms started AND 24 hours after all symptoms resolve for patients with symptoms. If he has no symptoms or your symptoms are resolving after 5 days he can leave his house.  Continue to wear a mask around others for 5 additional days.  2. Unvaccinated household contacts should begin quarantine and get tested  3. Asymptomatic fully vaccinated household contacts do not need to quarantine if they have no symptoms, but should consider testing 5-7 days after initial exposure  Patient was encouraged to notify close contacts, specifically those that they had direct physical contact with or those people that were within 6 feet for at least 15 minutes in a 24 hour period in the 3 days prior to onset of symptomsPt returned call and message was given.  Pt states that she has only had a HA and a fever 1 day.  She voiced understanding to the instructions and will call the clinic with further questions or concerns

## 2022-03-02 NOTE — NURSING NOTE
Comprehensive Intake Entered On:  1/4/2022 11:07 AM CST    Performed On:  1/4/2022 11:02 AM CST by Senait Goodwin LPN               Summary   Chief Complaint :   sinus pressure for the past few weeks, does get bloody nose when blowing it, headache for the past few days.    Menstrual Status :   Menarcheal   Weight Measured :   151 lb(Converted to: 151 lb 0 oz, 68.492 kg)    Height Measured :   60 in(Converted to: 5 ft 0 in, 152.40 cm)    Body Mass Index :   29.49 kg/m2 (HI)    Body Surface Area :   1.7 m2   Systolic Blood Pressure :   112 mmHg   Diastolic Blood Pressure :   72 mmHg   Mean Arterial Pressure :   85 mmHg   Peripheral Pulse Rate :   86 bpm   BP Site :   Right arm   Pulse Site :   Radial artery   BP Method :   Manual   HR Method :   Manual   Temperature Tympanic :   97.6 DegF(Converted to: 36.4 DegC)  (LOW)    Respiratory Rate :   18 br/min   Oxygen Saturation :   96 %   Race :      Languages :   English   Ethnicity :   Not  or    Senait Goodwin LPN - 1/4/2022 11:02 AM CST   Health Status   Allergies Verified? :   Yes   Medication History Verified? :   Yes   Immunizations Current :   Yes   Pre-Visit Planning Status :   Completed   Tobacco Use? :   Former smoker   Senait Goodwin LPN - 1/4/2022 11:02 AM CST   Consents   Consent for Immunization Exchange :   Consent Granted   Consent for Immunizations to Providers :   Consent Granted   Senait Goodwin LPN - 1/4/2022 11:02 AM CST   Meds / Allergies   (As Of: 1/4/2022 11:07:52 AM CST)   Allergies (Active)   amoxicillin  Estimated Onset Date:   Unspecified ; Created By:   Mili Olivera CMA; Reaction Status:   Active ; Substance:   amoxicillin ; Updated By:   Mili Olivera CMA; Reviewed Date:   9/1/2021 8:53 AM CDT      doxycycline  Estimated Onset Date:   Unspecified ; Reactions:   rash, itchy ; Created By:   Carleen Washington; Reaction Status:   Active ; Category:   Drug ; Substance:   doxycycline ; Type:   Allergy ; Updated By:    Carleen Washington; Reviewed Date:   9/1/2021 8:53 AM CDT      penicillin  Estimated Onset Date:   Unspecified ; Created By:   Mili Olivera CMA; Reaction Status:   Active ; Category:   Drug ; Substance:   penicillin ; Type:   Allergy ; Updated By:   Mili Olivera CMA; Reviewed Date:   9/1/2021 8:53 AM CDT      sulfa drug  Estimated Onset Date:   Unspecified ; Created By:   Mlii Olivera CMA; Reaction Status:   Active ; Substance:   sulfa drug ; Updated By:   Mili Olivera CMA; Reviewed Date:   9/1/2021 8:53 AM CDT        Medication List   (As Of: 1/4/2022 11:07:52 AM CST)   Prescription/Discharge Order    cefuroxime  :   cefuroxime ; Status:   Processing ; Ordered As Mnemonic:   cefuroxime 500 mg oral tablet ; Ordering Provider:   Orin Carlson; Action Display:   Complete ; Catalog Code:   cefuroxime ; Order Dt/Tm:   1/4/2022 11:05:30 AM CST          fluticasone nasal  :   fluticasone nasal ; Status:   Prescribed ; Ordered As Mnemonic:   fluticasone 50 mcg/inh nasal spray ; Simple Display Line:   See Instructions, 1 spray(s)  in each nostril daily, 16 gm, 8 Refill(s) ; Ordering Provider:   Orin Carlson; Catalog Code:   fluticasone nasal ; Order Dt/Tm:   9/1/2021 9:13:19 AM CDT          levocetirizine  :   levocetirizine ; Status:   Prescribed ; Ordered As Mnemonic:   levocetirizine 5 mg oral tablet ; Simple Display Line:   5 mg, 1 tab(s), Oral, qpm, 90 tab(s), 3 Refill(s) ; Ordering Provider:   Orin Carlson; Catalog Code:   levocetirizine ; Order Dt/Tm:   9/1/2021 9:16:58 AM CDT            Home Meds    pantoprazole  :   pantoprazole ; Status:   Documented ; Ordered As Mnemonic:   Protonix 20 mg oral delayed release tablet ; Simple Display Line:   40 mg, 2 tab(s), Oral, daily, 60 tab(s), 0 Refill(s) ; Catalog Code:   pantoprazole ; Order Dt/Tm:   8/29/2021 8:37:40 AM CDT

## 2022-03-02 NOTE — PROGRESS NOTES
Patient:   CAMRYN STEIN            MRN: [id number]            FIN: [id number]               Age:   46 years     Sex:  Female     :  1975   Associated Diagnoses:   Post-COVID-19 condition; Peripheral edema   Author:   Sergei Correa PA-C      Visit Information      Date of Service: 2022 03:52 pm  Performing Location: [facility]  Encounter#: [id number]      Chief Complaint   2022 3:57 PM CST    short term disablitly PPW        Subjective   Chief complaint 2022 3:57 PM CST    short term disablitly PPW  .     Had Covid Back to work 2022. Still some fatigue. LE edema on some days. No salt load. HAs have improved.      Health Status   Allergies:    Allergic Reactions (Selected)  Severity Not Documented  Amoxicillin (No reactions were documented)  Doxycycline (Rash, itchy)  Penicillin (No reactions were documented)  Sulfa drug (No reactions were documented)   Medications:  (Selected)   Prescriptions  Prescribed  Protonix 40 mg oral delayed release tablet: = 1 tab(s) ( 40 mg ), Oral, daily, # 90 tab(s), 1 Refill(s), Type: Maintenance, Pharmacy: [facility], 1 tab(s) Oral daily, 60, in, 22 15:57:00 CST, Height Measured, 157, lb, 22 15...  fluticasone 50 mcg/inh nasal spray: See Instructions, Instructions: 1 spray(s)  in each nostril daily, # 16 gm, 8 Refill(s), Type: Soft Stop, Pharmacy: [facility], 1 spray(s); in each nostril daily, 60, in, 21 8:50:00 CDT...  levocetirizine 5 mg oral tablet: = 1 tab(s) ( 5 mg ), Oral, qpm, # 90 tab(s), 3 Refill(s), Type: Soft Stop, Pharmacy: [facility], 1 tab(s) Oral qpm, 60, in, 21 8:50:00 CDT, Height Measured, 155.2, lb, 21 8:50:00...,    Medications          *denotes recorded medication          fluticasone 50 mcg/inh nasal spray: See  Instructions, 1 spray(s)  in each nostril daily, 16 gm, 8 Refill(s).          levocetirizine 5 mg oral tablet: 5 mg, 1 tab(s), Oral, qpm, 90 tab(s), 3 Refill(s).          Protonix 40 mg oral delayed release tablet: 40 mg, 1 tab(s), Oral, daily, 90 tab(s), 1 Refill(s).       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 Dry skin with some erythematous macular eruptions on the lower legs.      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    Orders     Forms filled out. Labs pending.     .

## 2022-03-21 ENCOUNTER — OFFICE VISIT (OUTPATIENT)
Dept: FAMILY MEDICINE | Facility: CLINIC | Age: 47
End: 2022-03-21
Payer: COMMERCIAL

## 2022-03-21 VITALS
DIASTOLIC BLOOD PRESSURE: 78 MMHG | HEART RATE: 79 BPM | WEIGHT: 153.9 LBS | BODY MASS INDEX: 30.06 KG/M2 | OXYGEN SATURATION: 96 % | SYSTOLIC BLOOD PRESSURE: 116 MMHG | TEMPERATURE: 98.4 F

## 2022-03-21 DIAGNOSIS — N89.8 VAGINAL ITCHING: ICD-10-CM

## 2022-03-21 DIAGNOSIS — B37.31 YEAST INFECTION OF THE VAGINA: Primary | ICD-10-CM

## 2022-03-21 DIAGNOSIS — N89.8 VAGINAL DISCHARGE: ICD-10-CM

## 2022-03-21 LAB
CLUE CELLS: ABNORMAL
TRICHOMONAS, WET PREP: ABNORMAL
WBC'S/HIGH POWER FIELD, WET PREP: ABNORMAL
YEAST, WET PREP: ABNORMAL

## 2022-03-21 PROCEDURE — 99213 OFFICE O/P EST LOW 20 MIN: CPT | Performed by: FAMILY MEDICINE

## 2022-03-21 PROCEDURE — 87210 SMEAR WET MOUNT SALINE/INK: CPT | Mod: QW | Performed by: FAMILY MEDICINE

## 2022-03-21 RX ORDER — PANTOPRAZOLE SODIUM 40 MG/1
40 TABLET, DELAYED RELEASE ORAL DAILY
COMMUNITY
Start: 2022-03-17 | End: 2022-10-25

## 2022-03-21 RX ORDER — LEVOCETIRIZINE DIHYDROCHLORIDE 5 MG/1
5 TABLET, FILM COATED ORAL EVERY MORNING
COMMUNITY
Start: 2021-11-08 | End: 2022-09-28

## 2022-03-21 RX ORDER — FLUCONAZOLE 150 MG/1
150 TABLET ORAL ONCE
Qty: 2 TABLET | Refills: 0 | Status: SHIPPED | OUTPATIENT
Start: 2022-03-21 | End: 2022-03-21

## 2022-03-21 RX ORDER — FLUTICASONE PROPIONATE 50 MCG
1 SPRAY, SUSPENSION (ML) NASAL DAILY PRN
COMMUNITY
Start: 2021-11-08 | End: 2023-03-13

## 2022-03-21 NOTE — PROGRESS NOTES
Assessment & Plan     Vaginal discharge     - Wet prep - Clinic Collect    Vaginal itching     - Wet prep - Clinic Collect    Yeast infection of the vagina  Wet prep did come back negative but patient's symptoms and clinical picture are consistent with yeast infection and we will treat as such.  Follow-up if not improving as anticipated.    - fluconazole (DIFLUCAN) 150 MG tablet; Take 1 tablet (150 mg) by mouth once for 1 dose May repeat the tablet in 72 hours if needed             BMI:   Estimated body mass index is 30.06 kg/m  as calculated from the following:    Height as of 1/24/22: 1.524 m (5').    Weight as of this encounter: 69.8 kg (153 lb 14.4 oz).           No follow-ups on file.    Lisseth Galvan MD  [facility]    Tod Koenig is a 47 year old who presents for the following health issues: vaginal itch, discharge, sore started Friday,denies urinary symptoms     Patient is a 47-year-old female who complains of vaginal itching and soreness for about 3 days.  She said a little bit of discharge.  No odor.  No concern about sexually transmitted infections.  No dysuria, urinary urgency or frequency  Status post hysterectomy 5 years ago  Positive history of yeast infections  Most recently she had a yeast infection 1 month ago that she treated over-the-counter with a 7-day cream  No abdominal pain or back pain  No fevers      Vaginal Problem     History of Present Illness       Reason for visit:  Yeast infection  Symptom onset:  1-3 days ago  Symptoms include:  Sore, itching  Symptom intensity:  Mild  Symptom progression:  Staying the same  Had these symptoms before:  Yes  Has tried/received treatment for these symptoms:  Yes  Previous treatment was successful:  Yes  Prior treatment description:  Otc    She eats 2-3 servings of fruits and vegetables daily.She consumes 0 sweetened beverage(s) daily.She exercises with enough effort to increase her heart rate 9 or less minutes  per day.  She exercises with enough effort to increase her heart rate 3 or less days per week.   She is taking medications regularly.             Review of Systems   Genitourinary: Positive for vaginal discharge.    Otherwise negative except listed in HPI        Objective    /78 (BP Location: Right arm, Patient Position: Sitting)   Pulse 79   Temp 98.4  F (36.9  C)   Wt 69.8 kg (153 lb 14.4 oz)   LMP 02/07/2002   SpO2 96%   BMI 30.06 kg/m    Body mass index is 30.06 kg/m .  Physical Exam   Vitals noted and within normal limits  In general she is alert, oriented, and in no acute distress  Abdomen soft and nontender   normal external female genitalia  Labia minora erythematous with scant amount of white discharge  Wet prep obtained  Wet prep with no yeast or bacteria    Results for orders placed or performed in visit on 03/21/22 (from the past 24 hour(s))   Wet prep - Clinic Collect    Specimen: Vagina; Swab   Result Value Ref Range    Trichomonas Absent Absent    Yeast Absent Absent    Clue Cells Absent Absent    WBCs/high power field 3+ (A) None

## 2022-04-13 ENCOUNTER — MYC MEDICAL ADVICE (OUTPATIENT)
Dept: FAMILY MEDICINE | Facility: CLINIC | Age: 47
End: 2022-04-13

## 2022-04-13 ENCOUNTER — E-VISIT (OUTPATIENT)
Dept: FAMILY MEDICINE | Facility: CLINIC | Age: 47
End: 2022-04-13
Payer: COMMERCIAL

## 2022-04-13 DIAGNOSIS — J01.11 ACUTE RECURRENT FRONTAL SINUSITIS: Primary | ICD-10-CM

## 2022-04-13 PROBLEM — F33.9 RECURRENT MAJOR DEPRESSIVE EPISODES (H): Status: ACTIVE | Noted: 2022-04-13

## 2022-04-13 PROBLEM — R87.69 ABNORMAL CYTOLOGICAL FINDINGS IN FEMALE GENITAL ORGANS: Status: ACTIVE | Noted: 2022-04-13

## 2022-04-13 PROBLEM — N94.6 DYSMENORRHEA: Status: ACTIVE | Noted: 2017-02-17

## 2022-04-13 PROBLEM — E66.9 OBESITY: Status: ACTIVE | Noted: 2022-04-13

## 2022-04-13 PROCEDURE — 99421 OL DIG E/M SVC 5-10 MIN: CPT | Performed by: FAMILY MEDICINE

## 2022-04-13 RX ORDER — CEFDINIR 300 MG/1
300 CAPSULE ORAL 2 TIMES DAILY
Qty: 20 CAPSULE | Refills: 0 | Status: SHIPPED | OUTPATIENT
Start: 2022-04-13 | End: 2022-04-23

## 2022-04-13 NOTE — PATIENT INSTRUCTIONS
Dear Liberty Mai is out of the office but I am happy to take care of this for you this week.  Given your description of the symptoms I am suspicious that this is related to ongoing sinusitis given that you still have pressure in your sinuses.  The cough is likely from sinus drainage.  I have sent in antibiotics as ordered.  If symptoms or not improving by next week he should be seen in clinic.  If you have not done at home testing for Covid or you would like to be screened for Covid through our drive-through Covid testing site please feel free to reach out and let me know.  Any time there is respiratory symptoms, it is reasonable to screen for Covid and were happy to help you do that now or in the future.  Please let us know if you have questions    Thanks for choosing us as your health care partner,    Jordana Snowden MD

## 2022-04-30 ENCOUNTER — HEALTH MAINTENANCE LETTER (OUTPATIENT)
Age: 47
End: 2022-04-30

## 2022-07-11 ENCOUNTER — VIRTUAL VISIT (OUTPATIENT)
Dept: FAMILY MEDICINE | Facility: CLINIC | Age: 47
End: 2022-07-11
Payer: COMMERCIAL

## 2022-07-11 ENCOUNTER — LAB (OUTPATIENT)
Dept: URGENT CARE | Facility: URGENT CARE | Age: 47
End: 2022-07-11
Payer: COMMERCIAL

## 2022-07-11 DIAGNOSIS — Z20.822 ENCOUNTER BY TELEHEALTH FOR SUSPECTED COVID-19: Primary | ICD-10-CM

## 2022-07-11 PROCEDURE — U0005 INFEC AGEN DETEC AMPLI PROBE: HCPCS | Performed by: PHYSICIAN ASSISTANT

## 2022-07-11 PROCEDURE — 99213 OFFICE O/P EST LOW 20 MIN: CPT | Mod: CS | Performed by: PHYSICIAN ASSISTANT

## 2022-07-11 PROCEDURE — U0003 INFECTIOUS AGENT DETECTION BY NUCLEIC ACID (DNA OR RNA); SEVERE ACUTE RESPIRATORY SYNDROME CORONAVIRUS 2 (SARS-COV-2) (CORONAVIRUS DISEASE [COVID-19]), AMPLIFIED PROBE TECHNIQUE, MAKING USE OF HIGH THROUGHPUT TECHNOLOGIES AS DESCRIBED BY CMS-2020-01-R: HCPCS | Performed by: PHYSICIAN ASSISTANT

## 2022-07-11 ASSESSMENT — ENCOUNTER SYMPTOMS
SORE THROAT: 1
SINUS PRESSURE: 1
CHILLS: 1

## 2022-07-11 NOTE — PROGRESS NOTES
Liberty is a 47 year old who is being evaluated via a billable telephone visit.      What phone number would you like to be contacted at? [phone]  How would you like to obtain your AVS? Janetthart    1. Encounter by telehealth for suspected COVID-19  Will get her tested for Covid today, continue to treat sxs, follow up if not improving  - Symptomatic; Yes; 7/9/2022 COVID-19 Virus (Coronavirus) by PCR Nose      Subjective   Liberty is a 47 year old accompanied by her self, presenting for the following health issues:  Chills (Pt c/o chills,ST and sinus pressure x 2-3 days.  Pt employer requiring covid testing), Pharyngitis, and Sinus Problem      HPI           She called in to work today because she was not feeling well  Her employer is requiring Covid testing    She is taking flonase and xyzal      Review of Systems   Constitutional: Positive for chills.   HENT: Positive for sinus pressure and sore throat.             Objective           Vitals:  No vitals were obtained today due to virtual visit.    Physical Exam   healthy, alert and no distress  PSYCH: Alert and oriented times 3; coherent speech, normal   rate and volume, able to articulate logical thoughts, able   to abstract reason, no tangential thoughts, no hallucinations   or delusions  Her affect is normal  RESP: No cough, no audible wheezing, able to talk in full sentences  Remainder of exam unable to be completed due to telephone visits                Phone call duration: 4 minutes    .  ..

## 2022-07-12 LAB — SARS-COV-2 RNA RESP QL NAA+PROBE: POSITIVE

## 2022-08-02 ENCOUNTER — TELEPHONE (OUTPATIENT)
Dept: FAMILY MEDICINE | Facility: CLINIC | Age: 47
End: 2022-08-02

## 2022-08-02 NOTE — TELEPHONE ENCOUNTER
I called pt and left a voicemail message stating [place] paperwork that she dropped off at clinic has been completed and faxed to  Disability Program [phone] per her request.  Original forms were mailed to pt home addressed.  Copies were sent to scan.  [phone] Elgin Dockery CMA

## 2022-09-28 DIAGNOSIS — J30.2 SEASONAL ALLERGIC RHINITIS: Primary | ICD-10-CM

## 2022-09-28 RX ORDER — LEVOCETIRIZINE DIHYDROCHLORIDE 5 MG/1
TABLET, FILM COATED ORAL
Qty: 90 TABLET | Refills: 0 | Status: SHIPPED | OUTPATIENT
Start: 2022-09-28 | End: 2023-03-13

## 2022-09-28 NOTE — TELEPHONE ENCOUNTER
"  Last Written Prescription Date:  Patient reported    Last office visit: 3/21/2022   Future Office Visit:              Requested Prescriptions   Pending Prescriptions Disp Refills     levocetirizine (XYZAL) 5 MG tablet [Pharmacy Med Name: levocetirizine 5 mg tablet] 90 tablet 3     Sig: TAKE ONE TABLET BY MOUTH EVERY EVENING       Antihistamines Protocol Passed - 9/28/2022  4:13 PM        Passed - Patient is 3-64 years of age     Apply weight-based dosing for peds patients age 3 - 12 years of age.    Forward request to provider for patients under the age of 3 or over the age of 64.          Passed - Recent (12 mo) or future (30 days) visit within the authorizing provider's specialty     Patient has had an office visit with the authorizing provider or a provider within the authorizing providers department within the previous 12 mos or has a future within next 30 days. See \"Patient Info\" tab in inbasket, or \"Choose Columns\" in Meds & Orders section of the refill encounter.              Passed - Medication is active on med SHAVONNE Schwartz RN 09/28/22 4:31 PM    "

## 2022-10-22 DIAGNOSIS — K21.9 GASTROESOPHAGEAL REFLUX DISEASE WITHOUT ESOPHAGITIS: Primary | ICD-10-CM

## 2022-10-25 RX ORDER — PANTOPRAZOLE SODIUM 40 MG/1
TABLET, DELAYED RELEASE ORAL
Qty: 90 TABLET | Refills: 0 | Status: SHIPPED | OUTPATIENT
Start: 2022-10-25 | End: 2023-04-12

## 2022-10-25 NOTE — TELEPHONE ENCOUNTER
"    Last Written Prescription Date:  Patient reported    Last office visit: 3/21/2022    Future Office Visit:              Requested Prescriptions   Pending Prescriptions Disp Refills     pantoprazole (PROTONIX) 40 MG EC tablet [Pharmacy Med Name: pantoprazole 40 mg tablet,delayed release] 90 tablet 1     Sig: TAKE ONE Tablet BY MOUTH EVERY DAY       PPI Protocol Passed - 10/22/2022  8:01 AM        Passed - Not on Clopidogrel (unless Pantoprazole ordered)        Passed - No diagnosis of osteoporosis on record        Passed - Recent (12 mo) or future (30 days) visit within the authorizing provider's specialty     Patient has had an office visit with the authorizing provider or a provider within the authorizing providers department within the previous 12 mos or has a future within next 30 days. See \"Patient Info\" tab in inbasket, or \"Choose Columns\" in Meds & Orders section of the refill encounter.              Passed - Medication is active on med list        Passed - Patient is age 18 or older        Passed - No active pregnacy on record        Passed - No positive pregnancy test in past 12 months             SHAVONNE HO RN 10/25/22 10:30 AM    "

## 2022-11-20 ENCOUNTER — HEALTH MAINTENANCE LETTER (OUTPATIENT)
Age: 47
End: 2022-11-20

## 2023-03-13 ENCOUNTER — OFFICE VISIT (OUTPATIENT)
Dept: FAMILY MEDICINE | Facility: CLINIC | Age: 48
End: 2023-03-13
Payer: COMMERCIAL

## 2023-03-13 VITALS
HEART RATE: 72 BPM | RESPIRATION RATE: 16 BRPM | BODY MASS INDEX: 30.63 KG/M2 | SYSTOLIC BLOOD PRESSURE: 122 MMHG | HEIGHT: 60 IN | TEMPERATURE: 98.1 F | OXYGEN SATURATION: 99 % | WEIGHT: 156 LBS | DIASTOLIC BLOOD PRESSURE: 76 MMHG

## 2023-03-13 DIAGNOSIS — F33.9 RECURRENT MAJOR DEPRESSIVE EPISODES (H): ICD-10-CM

## 2023-03-13 DIAGNOSIS — F41.1 GAD (GENERALIZED ANXIETY DISORDER): ICD-10-CM

## 2023-03-13 DIAGNOSIS — J30.2 SEASONAL ALLERGIC RHINITIS, UNSPECIFIED TRIGGER: ICD-10-CM

## 2023-03-13 DIAGNOSIS — K21.9 GASTROESOPHAGEAL REFLUX DISEASE WITHOUT ESOPHAGITIS: ICD-10-CM

## 2023-03-13 DIAGNOSIS — J01.01 ACUTE RECURRENT MAXILLARY SINUSITIS: Primary | ICD-10-CM

## 2023-03-13 PROCEDURE — 99214 OFFICE O/P EST MOD 30 MIN: CPT | Performed by: PHYSICIAN ASSISTANT

## 2023-03-13 RX ORDER — FLUTICASONE PROPIONATE 50 MCG
1 SPRAY, SUSPENSION (ML) NASAL DAILY PRN
Qty: 16 G | Refills: 11 | Status: SHIPPED | OUTPATIENT
Start: 2023-03-13

## 2023-03-13 RX ORDER — LEVOCETIRIZINE DIHYDROCHLORIDE 5 MG/1
5 TABLET, FILM COATED ORAL EVERY MORNING
Qty: 90 TABLET | Refills: 3 | Status: SHIPPED | OUTPATIENT
Start: 2023-03-13

## 2023-03-13 RX ORDER — CEFDINIR 300 MG/1
300 CAPSULE ORAL 2 TIMES DAILY
Qty: 20 CAPSULE | Refills: 0 | Status: SHIPPED | OUTPATIENT
Start: 2023-03-13 | End: 2023-04-12

## 2023-03-13 RX ORDER — FAMOTIDINE 20 MG/1
20 TABLET, FILM COATED ORAL 2 TIMES DAILY
Qty: 60 TABLET | Refills: 11 | Status: SHIPPED | OUTPATIENT
Start: 2023-03-13

## 2023-03-13 RX ORDER — PANTOPRAZOLE SODIUM 40 MG/1
40 TABLET, DELAYED RELEASE ORAL DAILY
Qty: 90 TABLET | Refills: 0 | Status: CANCELLED | OUTPATIENT
Start: 2023-03-13

## 2023-03-13 RX ORDER — ESCITALOPRAM OXALATE 10 MG/1
10 TABLET ORAL DAILY
Qty: 30 TABLET | Refills: 0 | Status: SHIPPED | OUTPATIENT
Start: 2023-03-13 | End: 2023-04-12

## 2023-03-13 ASSESSMENT — ENCOUNTER SYMPTOMS
ACTIVITY CHANGE: 1
RHINORRHEA: 1
DYSPHORIC MOOD: 1
HEADACHES: 1
EYES NEGATIVE: 1
SINUS PRESSURE: 1
DIARRHEA: 1
HEMATOCHEZIA: 0
SLEEP DISTURBANCE: 1
ABDOMINAL PAIN: 0

## 2023-03-13 ASSESSMENT — PATIENT HEALTH QUESTIONNAIRE - PHQ9
10. IF YOU CHECKED OFF ANY PROBLEMS, HOW DIFFICULT HAVE THESE PROBLEMS MADE IT FOR YOU TO DO YOUR WORK, TAKE CARE OF THINGS AT HOME, OR GET ALONG WITH OTHER PEOPLE: SOMEWHAT DIFFICULT
SUM OF ALL RESPONSES TO PHQ QUESTIONS 1-9: 6
SUM OF ALL RESPONSES TO PHQ QUESTIONS 1-9: 6

## 2023-03-13 NOTE — PROGRESS NOTES
Assessment & Plan     Gastroesophageal reflux disease without esophagitis  Protonix and see if we could control her symptoms with the famotidine and eliminate her diarrhea  - famotidine (PEPCID) 20 MG tablet  Dispense: 60 tablet; Refill: 11    Seasonal allergic rhinitis, unspecified trigger  Occasions renewed for 1  - levocetirizine (XYZAL) 5 MG tablet  Dispense: 90 tablet; Refill: 3  - fluticasone (FLONASE) 50 MCG/ACT nasal spray  Dispense: 16 g; Refill: 11    Acute recurrent maxillary sinusitis  We will treat with cefdinir she tolerates cephalosporins without difficulty  - fluticasone (FLONASE) 50 MCG/ACT nasal spray  Dispense: 16 g; Refill: 11  - cefdinir (OMNICEF) 300 MG capsule  Dispense: 20 capsule; Refill: 0    JESUS (generalized anxiety disorder)  Will start as escitalopram dosing discussed side effects discussed  - escitalopram (LEXAPRO) 10 MG tablet  Dispense: 30 tablet; Refill: 0    Recurrent major depressive episodes (H)  See above recheck in 3-1/2 weeks prior as needed               BMI:   Estimated body mass index is 30.47 kg/m  as calculated from the following:    Height as of this encounter: 1.524 m (5').    Weight as of this encounter: 70.8 kg (156 lb).           No follow-ups on file.    DENNIS Jones  [facility] [place]    Tod Koenig is a 48 year old, presenting for the following health issues:  Follow Up      48-year-old female here for chronic disease follow-up she has a history of GERD she also has a history of perennial allergic rhinitis.  Acutely she has noticed some purulent nasal discharge and some sinus pressure intermittent epistaxis she does try to use her nasal saline does not always use her fluticasone  She has also noticed that she has looser stools 3-4 times daily no blood has been noted there she had no fever no chills no significant abdominal pain she wonders if it is related to her Protonix next concern is her work she has a very occult coworker  who seems to pick things all day long and the patient finds that is very wearing on her  She has a history of depression she has no homicidal or suicidal ideation she has not been on medications for her mood in a number of years have some anxiety as well and anxiousness in regards to work  She gets up very early in the morning because her shift starts early she is done mid afternoon but when she gets home she finds that she makes up her cleans up and then is exhausted and does not have any energy to do anything  She does not enjoy her free time on the weekends and tends to just lungs around the house    History of Present Illness       Reason for visit:  Medication follow up    She eats 0-1 servings of fruits and vegetables daily.She consumes 0 sweetened beverage(s) daily.She exercises with enough effort to increase her heart rate 9 or less minutes per day.  She exercises with enough effort to increase her heart rate 3 or less days per week.   She is taking medications regularly.    Today's PHQ-9         PHQ-9 Total Score: 6    PHQ-9 Q9 Thoughts of better off dead/self-harm past 2 weeks :   Not at all    How difficult have these problems made it for you to do your work, take care of things at home, or get along with other people: Somewhat difficult             Review of Systems   Constitutional: Positive for activity change.   HENT: Positive for congestion, rhinorrhea and sinus pressure.    Eyes: Negative.    Gastrointestinal: Positive for diarrhea. Negative for abdominal pain and hematochezia.   Neurological: Positive for headaches.   Psychiatric/Behavioral: Positive for dysphoric mood, mood changes and sleep disturbance. Negative for self-injury and suicidal ideas.            Objective    /76   Pulse 72   Temp 98.1  F (36.7  C)   Resp 16   Ht 1.524 m (5')   Wt 70.8 kg (156 lb)   LMP 02/07/2002   SpO2 99%   BMI 30.47 kg/m    Body mass index is 30.47 kg/m .  Physical Exam attentive no acute  distress  Ears canals and drums normal  Nasal mucosa is inflamed there is purulent rhinorrhea bilaterally maxillary tenderness noted  Oropharynx benign  Neck supple  Lungs clear to ventilated  Cardiovascular gait and rhythm  Abdomen normal active bowel sounds all 4 quadrants soft nontender no palpable masses organomegaly

## 2023-03-16 RX ORDER — FLUCONAZOLE 150 MG/1
150 TABLET ORAL
Qty: 3 TABLET | Refills: 0 | Status: SHIPPED | OUTPATIENT
Start: 2023-03-16 | End: 2023-03-23

## 2023-04-09 ENCOUNTER — NURSE TRIAGE (OUTPATIENT)
Dept: NURSING | Facility: CLINIC | Age: 48
End: 2023-04-09
Payer: COMMERCIAL

## 2023-04-09 NOTE — TELEPHONE ENCOUNTER
"Pt calling re: rash.    Says she's not exactly sure how long she has had it, but it's gotten worse over the last few days.Says she was recently switched from Protonix to Pepcid and wonders if it's related. Escitalopram is also a new medication started within the last couple weeks.    Says the rash is around her neck and chest. It starts under ears is red and then comes around to the front. Very itchy. Says it \"looks like hives.\" Denies any difficulty breathing or facial swelling. Has tried lotion, oatmeal bath, hydrocortisone ointment, but nothing seems to be helping. Pt already takes daily allergy medication for antihistamine.    Protocol recommends see PCP within 24 hours. Advised that pt could go to  today, do a VV or wait until tomorrow morning to call her PCP to see if she can get in. Pt states that she needs to make a f/u appt with PCP in regards to her new antidepressant anyways, so will try to make a VV with him. Care advice reviewed and transferred to scheduling.    Radha Gates, RN, BSN  [facility]   Triage Nurse Advisor        Reason for Disposition    [1] Applying cream or ointment AND [2] causes severe itch, burning or pain    Additional Information    Negative: [1] Sudden onset of rash (within last 2 hours) AND [2] difficulty breathing or swallowing    Negative: Sounds like a life-threatening emergency to the triager    Negative: Athlete's Foot suspected (i.e., itchy rash between the toes)    Negative: Impetigo suspected (i.e., painless infected superficial small sores, less than 1 inch or 2.5 cm, often covered by a soft, yellow-brown scab or crust; sometimes occurring near nasal openings)    Negative: Insect bite(s) suspected    Negative: Jock Itch suspected (i.e., itchy rash on inner thighs near genital area)    Negative: Localized lump (or swelling) without redness or rash    Negative: Poison ivy, oak, or sumac contact suspected    Negative: Rash of female genital area (vulva)    Negative: " Rash of male genital area (penis or scrotum)    Negative: Redness of immunization site    Negative: Ringworm suspected (i.e., round pink patch, sometimes looks like ring, usually 1/2 to 1 inch [12-25 mm],  in size, slowly increasing in size)    Negative: Shingles suspected (i.e., painful rash, multiple small blisters grouped together in one area of body; dermatomal distribution)    Negative: Small spot, skin growth, or mole    Negative: Sores or skin ulcer, not a rash    Negative: Wound infection suspected (i.e., pain, spreading redness, or pus; in a cut, puncture, scrape or sutured wound)    Negative: [1] Localized purple or blood-colored spots or dots AND [2] not from injury or friction AND [3] fever    Negative: Patient sounds very sick or weak to the triager    Negative: [1] Red area or streak AND [2] fever    Negative: [1] Rash is painful to touch AND [2] fever    Negative: [1] Looks infected (spreading redness, pus) AND [2] large red area (> 2 in. or 5 cm)    Negative: [1] Looks infected (spreading redness, pus) AND [2] diabetes mellitus or weak immune system (e.g., HIV positive, cancer chemo, splenectomy, organ transplant, chronic steroids)    Negative: [1] Localized purple or blood-colored spots or dots AND [2] not from injury or friction AND [3] no fever    Negative: [1] Looks infected (spreading redness, pus) AND [2] no fever    Negative: Looks like a boil, infected sore, deep ulcer or other infected rash    Negative: [1] Localized rash is very painful AND [2] no fever    Negative: Genital area rash    Negative: Lyme disease suspected (e.g., bull's eye rash or tick bite / exposure)    Negative: Medication patch causing local rash or itching    Protocols used: RASH OR REDNESS - [id number]

## 2023-04-12 ENCOUNTER — VIRTUAL VISIT (OUTPATIENT)
Dept: FAMILY MEDICINE | Facility: CLINIC | Age: 48
End: 2023-04-12
Payer: COMMERCIAL

## 2023-04-12 DIAGNOSIS — F41.1 GAD (GENERALIZED ANXIETY DISORDER): ICD-10-CM

## 2023-04-12 DIAGNOSIS — R21 RASH AND NONSPECIFIC SKIN ERUPTION: Primary | ICD-10-CM

## 2023-04-12 DIAGNOSIS — J30.2 SEASONAL ALLERGIC RHINITIS, UNSPECIFIED TRIGGER: ICD-10-CM

## 2023-04-12 PROCEDURE — 99213 OFFICE O/P EST LOW 20 MIN: CPT | Mod: VID | Performed by: PHYSICIAN ASSISTANT

## 2023-04-12 RX ORDER — ESCITALOPRAM OXALATE 10 MG/1
10 TABLET ORAL DAILY
Qty: 90 TABLET | Refills: 0 | Status: SHIPPED | OUTPATIENT
Start: 2023-04-12 | End: 2023-08-31

## 2023-04-12 RX ORDER — NYSTATIN 100000 U/G
CREAM TOPICAL 2 TIMES DAILY
Qty: 15 G | Refills: 1 | Status: SHIPPED | OUTPATIENT
Start: 2023-04-12 | End: 2023-08-31

## 2023-04-12 ASSESSMENT — ENCOUNTER SYMPTOMS
CONSTITUTIONAL NEGATIVE: 1
SLEEP DISTURBANCE: 0
NERVOUS/ANXIOUS: 1
RESPIRATORY NEGATIVE: 1

## 2023-04-12 NOTE — PROGRESS NOTES
Liberty is a 48 year old who is being evaluated via a billable video visit.      How would you like to obtain your AVS? MyChart  If the video visit is dropped, the invitation should be resent by: Text to cell phone: [phone]  Will anyone else be joining your video visit? No        Assessment & Plan     Rash and nonspecific skin eruption  Because of the lack of response to hydrocortisone we will give trial of nystatin continue the antihistamine  - nystatin (MYCOSTATIN) 534951 UNIT/GM external cream  Dispense: 15 g; Refill: 1    JESUS (generalized anxiety disorder)  Doing much better we will continue this medication see in 3 months as needed  - escitalopram (LEXAPRO) 10 MG tablet  Dispense: 90 tablet; Refill: 0    Seasonal allergic rhinitis, unspecified trigger  Continue her antihistamine               BMI:   Estimated body mass index is 30.47 kg/m  as calculated from the following:    Height as of 3/13/23: 1.524 m (5').    Weight as of 3/13/23: 70.8 kg (156 lb).           DENNIS Jones  [facility]   Liberty is a 48 year old, presenting for the following health issues:  Derm Problem (Rash)    Patient calls for 2 concerns #1 to follow-up for recent start of Lexapro for generalized anxiety she is doing much better and tolerates medication without difficulty  Second concern is a rash first developed in 1 axilla then spread to the other now is on her neck it is intensely pruritic she denies any other new contacts initially she thought it was a razor burn but it is not responding she has been using some calamine lotion which helps with the itch but does not help it cleared up hydrocortisone has been ineffective fever no chills no pustules she is on once daily second-generation antihistamine    History of Present Illness       Reason for visit:  Follow up on meds and new developing rash    She eats 0-1 servings of fruits and vegetables daily.She consumes 0 sweetened  beverage(s) daily.She exercises with enough effort to increase her heart rate 9 or less minutes per day.  She exercises with enough effort to increase her heart rate 3 or less days per week.   She is taking medications regularly.       Onset: 2 weeks ago  Description: Rash under arms  Has spread to nack and back and around arms.  Itching.  Red splotchy rash.     Therapies tried and outcome: oatmeal baths, cortisone cream ineffective, calamine good for itching.          Review of Systems   Constitutional: Negative.    HENT: Negative.    Respiratory: Negative.    Skin: Positive for rash.   Psychiatric/Behavioral: Negative for self-injury, sleep disturbance and suicidal ideas. The patient is nervous/anxious.             Objective           Vitals:  No vitals were obtained today due to virtual visit.    Physical Exam   GENERAL: Healthy, alert and no distress  EYES: Eyes grossly normal to inspection.  No discharge or erythema, or obvious scleral/conjunctival abnormalities.  RESP: No audible wheeze, cough, or visible cyanosis.  No visible retractions or increased work of breathing.    SKIN: Visible skin clear. No significant rash, abnormal pigmentation or lesions.  NEURO: Cranial nerves grossly intact.  Mentation and speech appropriate for age.  PSYCH: Mentation appears normal, affect normal/bright, judgement and insight intact, normal speech and appearance well-groomed.                Video-Visit Details    Type of service:  Video Visit     Originating Location (pt. Location): Home  Distant Location (provider location):  On-site  Platform used for Video Visit: Elisa

## 2023-07-02 ENCOUNTER — HEALTH MAINTENANCE LETTER (OUTPATIENT)
Age: 48
End: 2023-07-02

## 2023-08-23 ASSESSMENT — PATIENT HEALTH QUESTIONNAIRE - PHQ9
SUM OF ALL RESPONSES TO PHQ QUESTIONS 1-9: 3
10. IF YOU CHECKED OFF ANY PROBLEMS, HOW DIFFICULT HAVE THESE PROBLEMS MADE IT FOR YOU TO DO YOUR WORK, TAKE CARE OF THINGS AT HOME, OR GET ALONG WITH OTHER PEOPLE: NOT DIFFICULT AT ALL
SUM OF ALL RESPONSES TO PHQ QUESTIONS 1-9: 3

## 2023-08-24 ENCOUNTER — OFFICE VISIT (OUTPATIENT)
Dept: FAMILY MEDICINE | Facility: CLINIC | Age: 48
End: 2023-08-24
Payer: COMMERCIAL

## 2023-08-24 ENCOUNTER — ANCILLARY PROCEDURE (OUTPATIENT)
Dept: GENERAL RADIOLOGY | Facility: CLINIC | Age: 48
End: 2023-08-24
Attending: FAMILY MEDICINE
Payer: COMMERCIAL

## 2023-08-24 VITALS
HEART RATE: 71 BPM | HEIGHT: 62 IN | BODY MASS INDEX: 27.22 KG/M2 | TEMPERATURE: 98.6 F | OXYGEN SATURATION: 98 % | RESPIRATION RATE: 20 BRPM | SYSTOLIC BLOOD PRESSURE: 114 MMHG | WEIGHT: 147.9 LBS | DIASTOLIC BLOOD PRESSURE: 73 MMHG

## 2023-08-24 DIAGNOSIS — M79.671 RIGHT FOOT PAIN: Primary | ICD-10-CM

## 2023-08-24 DIAGNOSIS — M79.671 RIGHT FOOT PAIN: ICD-10-CM

## 2023-08-24 DIAGNOSIS — S39.012A STRAIN OF LUMBAR REGION, INITIAL ENCOUNTER: ICD-10-CM

## 2023-08-24 PROCEDURE — 99214 OFFICE O/P EST MOD 30 MIN: CPT | Performed by: FAMILY MEDICINE

## 2023-08-24 PROCEDURE — 73630 X-RAY EXAM OF FOOT: CPT | Mod: TC | Performed by: RADIOLOGY

## 2023-08-24 NOTE — PROGRESS NOTES
"  Assessment & Plan     Right foot pain  X-ray of the right foot appears normal.  She may be developing tarsal tunnel syndrome.  Advised stiff soled shoe with better support.  Follow-up if symptoms or not improving.  - XR Foot Right G/E 3 Views; Future    Strain of lumbar region, initial encounter  Physical therapy is available through her workplace.  Prescription has been written for a therapy program.  - Physical Therapy Referral; Future             BMI:   Estimated body mass index is 27.05 kg/m  as calculated from the following:    Height as of this encounter: 1.575 m (5' 2\").    Weight as of this encounter: 67.1 kg (147 lb 14.4 oz).           Fredrick Winters MD  [facility] [place]    Tod Koenig is a 48 year old, presenting for the following health issues:  Musculoskeletal Problem (C/o right sore foot and low back pain)        8/24/2023     4:24 PM   Additional Questions   Roomed by Virgen DRAPER CMA   Accompanied by Self       History of Present Illness       Reason for visit:  Sore foot and lower back  Symptom onset:  More than a month  Symptoms include:  Sore foot, bump on outside of right foot  Symptom intensity:  Mild  Symptom progression:  Staying the same  Had these symptoms before:  No  What makes it worse:  Standing or walking  What makes it better:  Sitting    She eats 0-1 servings of fruits and vegetables daily.She consumes 0 sweetened beverage(s) daily.She exercises with enough effort to increase her heart rate 9 or less minutes per day.  She exercises with enough effort to increase her heart rate 3 or less days per week. She is missing 1 dose(s) of medications per week.     Patient denies any injury.  She has not changed her footwear.  She spends a lot of time on her feet at work.  She also recently developed lumbar back pain.  She reports several times a month as a trouble.  She reports this occurs with minimal trauma.  She denies bowel or bladder problems.  She has not " "noticed any weakness.            Review of Systems   Constitutional, HEENT, cardiovascular, pulmonary, gi and gu systems are negative, except as otherwise noted.      Objective    /73 (BP Location: Right arm, Patient Position: Sitting, Cuff Size: Adult Regular)   Pulse 71   Temp 98.6  F (37  C) (Tympanic)   Resp 20   Ht 1.575 m (5' 2\")   Wt 67.1 kg (147 lb 14.4 oz)   LMP 02/07/2002   SpO2 98%   BMI 27.05 kg/m    Body mass index is 27.05 kg/m .  Physical Exam   Alert, oriented, no acute distress  Neck is supple with adenopathy  Lungs clear  Heart is regular rate and rhythm  Lumbar exam reveals central tenderness extending up to the SI joints.  She has good lumbar range of motion.  Lower extremity neurologic exam is unremarkable, negative straight leg raise  Right foot exam reveals tenderness over the base of the fifth metatarsal, no other significant deformities.                      "

## 2023-08-31 DIAGNOSIS — F41.1 GAD (GENERALIZED ANXIETY DISORDER): ICD-10-CM

## 2023-08-31 DIAGNOSIS — R21 RASH AND NONSPECIFIC SKIN ERUPTION: ICD-10-CM

## 2023-08-31 RX ORDER — NYSTATIN 100000 U/G
CREAM TOPICAL 2 TIMES DAILY
Qty: 15 G | Refills: 1 | Status: SHIPPED | OUTPATIENT
Start: 2023-08-31

## 2023-08-31 RX ORDER — ESCITALOPRAM OXALATE 10 MG/1
10 TABLET ORAL DAILY
Qty: 90 TABLET | Refills: 3 | Status: SHIPPED | OUTPATIENT
Start: 2023-08-31

## 2023-08-31 NOTE — TELEPHONE ENCOUNTER
"Last Written Prescription Date:  4/12/23  Last Fill Quantity: 15 g,  # refills: 1   Last office visit provider:  8/24/23    Requested Prescriptions   Pending Prescriptions Disp Refills    nystatin (MYCOSTATIN) 839223 UNIT/GM external cream [Pharmacy Med Name: nystatin 100,000 unit/gram topical cream] 15 g 1     Sig: Apply topically 2 times daily       Antifungal Agents Passed - 8/31/2023  3:53 PM        Passed - Recent (12 mo) or future (30 days) visit within the authorizing provider's specialty     Patient has had an office visit with the authorizing provider or a provider within the authorizing providers department within the previous 12 mos or has a future within next 30 days. See \"Patient Info\" tab in inbasket, or \"Choose Columns\" in Meds & Orders section of the refill encounter.              Passed - Not Fluconazole or Terconazole      If oral Fluconazole or Terconazole, may refill if indicated in progress notes.           Passed - Medication is active on med list      Last Written Prescription Date:  4/12/23  Last Fill Quantity: 90,  # refills: 0   Last office visit provider:  8/24/23      escitalopram (LEXAPRO) 10 MG tablet [Pharmacy Med Name: escitalopram 10 mg tablet] 90 tablet 1     Sig: Take 1 tablet (10 mg) by mouth daily       SSRIs Protocol Passed - 8/31/2023  3:53 PM        Passed - Recent (12 mo) or future (30 days) visit within the authorizing provider's specialty     Patient has had an office visit with the authorizing provider or a provider within the authorizing providers department within the previous 12 mos or has a future within next 30 days. See \"Patient Info\" tab in inbasket, or \"Choose Columns\" in Meds & Orders section of the refill encounter.              Passed - Medication is active on med list        Passed - Patient is age 18 or older        Passed - No active pregnancy on record        Passed - No positive pregnancy test in last 12 months             Staci Contreras RN 08/31/23 " 4:36 PM

## 2023-09-19 ENCOUNTER — TELEPHONE (OUTPATIENT)
Dept: FAMILY MEDICINE | Facility: CLINIC | Age: 48
End: 2023-09-19

## 2023-09-19 ENCOUNTER — VIRTUAL VISIT (OUTPATIENT)
Dept: FAMILY MEDICINE | Facility: CLINIC | Age: 48
End: 2023-09-19
Payer: COMMERCIAL

## 2023-09-19 DIAGNOSIS — J06.9 VIRAL UPPER RESPIRATORY TRACT INFECTION: Primary | ICD-10-CM

## 2023-09-19 PROCEDURE — 99213 OFFICE O/P EST LOW 20 MIN: CPT | Mod: VID | Performed by: FAMILY MEDICINE

## 2023-09-19 NOTE — TELEPHONE ENCOUNTER
Reason for Call:  Appointment Request    Patient requesting this type of appt: Chronic Diease Management/Medication/Follow-Up    Requested provider: Sergei Correa    Reason patient unable to be scheduled: Not within requested timeframe    When does patient want to be seen/preferred time: Same day    Comments: Pt called and waited for Dr. Moore to send link for appt 9/19/2023 and stated that a link wasn't sent. Only gotten a call that provider was running late. She would like to be seen asap 9/20/2023      Could we send this information to you in [facility] or would you prefer to receive a phone call?:   Patient would prefer a phone call   Okay to leave a detailed message?: Yes at Cell number on file:    Telephone Information:   Mobile [phone]       Call taken on 9/19/2023 at 5:57 PM by NALINI MCKENZIE

## 2023-09-19 NOTE — PROGRESS NOTES
"Liberty is a 48 year old who is being evaluated via a billable video visit.      How would you like to obtain your AVS? MyChart  If the video visit is dropped, the invitation should be resent by:   Will anyone else be joining your video visit? No          Assessment & Plan   Problem List Items Addressed This Visit    None  Visit Diagnoses       Viral upper respiratory tract infection    -  Primary           Symptoms started 3 days ago with sore throat she has a cough usually nonproductive but if productive with some clear fluid.  She has been coughing enough that it hurts some when she coughs however she has no shortness of breath.  She has had allergies for the past few months.  She does not have any sinus tenderness when she pushes on her own sinuses.  She has some congestion.  Discussed that if her symptoms seem to localize to one of her sinuses were to have facial pain or ear pain after having symptoms a week and 1/2 to 2 weeks she should let us know.  We also discussed that we could certainly do COVID-19 testing especially if she would like to have paxlovid.  She is within the 5-day window in which paxlovid could be used to reduce the risk of hospitalization and death by approximately 90%.  Declines having me order a COVID-19 test for her.  In the meantime would like her to stay hydrated, stay away from other people when she is sick, get plenty of rest, and can use Tylenol and ibuprofen as needed for pain.           BMI:   Estimated body mass index is 27.05 kg/m  as calculated from the following:    Height as of 8/24/23: 1.575 m (5' 2\").    Weight as of 8/24/23: 67.1 kg (147 lb 14.4 oz).           Claire Moore MD  [facility]    Subjective   Liberty is a 48 year old, presenting for the following health issues:  No chief complaint on file.      9/19/2023     4:10 PM   Additional Questions   Roomed by Madison       History of Present Illness       Reason for visit:  Sinus infection " and or bronchitis  Symptom onset:  1-3 days ago  Symptoms include:  Sore throat. Light fever, headache, dry cough  Symptom intensity:  Moderate  Symptom progression:  Staying the same  Had these symptoms before:  No  What makes it worse:  Lying down makes cough worse    She eats 0-1 servings of fruits and vegetables daily.She consumes 1 sweetened beverage(s) daily.She exercises with enough effort to increase her heart rate 9 or less minutes per day.  She exercises with enough effort to increase her heart rate 3 or less days per week.   She is taking medications regularly.               Review of Systems         Objective           Vitals:  No vitals were obtained today due to virtual visit.    Physical Exam   GENERAL: Healthy, alert and no distress  EYES: Eyes grossly normal to inspection.  No discharge or erythema, or obvious scleral/conjunctival abnormalities.  RESP: No audible wheeze, cough, or visible cyanosis.  No visible retractions or increased work of breathing.    SKIN: Visible skin clear. No significant rash, abnormal pigmentation or lesions.  NEURO: Cranial nerves grossly intact.  Mentation and speech appropriate for age.  PSYCH: Mentation appears normal, affect normal/bright, judgement and insight intact, normal speech and appearance well-groomed.                Video-Visit Details    Type of service:  Video Visit     Originating Location (pt. Location): Home    Distant Location (provider location):  On-site  Platform used for Video Visit: Elisa

## 2023-09-20 ENCOUNTER — VIRTUAL VISIT (OUTPATIENT)
Dept: FAMILY MEDICINE | Facility: CLINIC | Age: 48
End: 2023-09-20
Payer: COMMERCIAL

## 2023-09-20 ENCOUNTER — MYC MEDICAL ADVICE (OUTPATIENT)
Dept: FAMILY MEDICINE | Facility: CLINIC | Age: 48
End: 2023-09-20

## 2023-09-20 DIAGNOSIS — B37.31 YEAST INFECTION OF THE VAGINA: ICD-10-CM

## 2023-09-20 DIAGNOSIS — J01.01 ACUTE RECURRENT MAXILLARY SINUSITIS: Primary | ICD-10-CM

## 2023-09-20 PROCEDURE — 99213 OFFICE O/P EST LOW 20 MIN: CPT | Mod: 93 | Performed by: PHYSICIAN ASSISTANT

## 2023-09-20 RX ORDER — CEFDINIR 300 MG/1
300 CAPSULE ORAL 2 TIMES DAILY
Qty: 20 CAPSULE | Refills: 0 | Status: SHIPPED | OUTPATIENT
Start: 2023-09-20 | End: 2024-04-16

## 2023-09-20 ASSESSMENT — ENCOUNTER SYMPTOMS
HEADACHES: 0
SINUS PAIN: 1
COUGH: 1
ACTIVITY CHANGE: 1
SINUS PRESSURE: 1
CHEST TIGHTNESS: 0
RHINORRHEA: 1
SHORTNESS OF BREATH: 0

## 2023-09-20 NOTE — PROGRESS NOTES
"Liberty is a 48 year old who is being evaluated via a billable telephone visit.      What phone number would you like to be contacted at? [phone]   How would you like to obtain your AVS? Zechariah    Distant Location (provider location):  On-site    Assessment & Plan     Acute recurrent maxillary sinusitis  Fluids steam self should gradually improve not acutely worsen or recheck.  - cefdinir (OMNICEF) 300 MG capsule  Dispense: 20 capsule; Refill: 0               BMI:   Estimated body mass index is 27.05 kg/m  as calculated from the following:    Height as of 8/24/23: 1.575 m (5' 2\").    Weight as of 8/24/23: 67.1 kg (147 lb 14.4 oz).         DENNIS Jones  [facility]    Subjective   Liberty is a 48 year old, presenting for the following health issues:  Forms, work note, URI, and Sinus Problem      48-year-old presents virtually for sinus symptoms symptoms started over the weekend now she has pain pressure she has minimal cough no fever no known COVID exposure history of recurrent sinusitis no severe headache no stiff neck           Onset: 9-17-23  Description: Started with sore throat, now has cough, nasal/sinus pressure, headaches,   Intensity: mild to moderate  Progression of Symptoms:  same    Therapies tried and outcome: otc cough syrup and acetaminophen         Review of Systems   Constitutional:  Positive for activity change.   HENT:  Positive for congestion, rhinorrhea, sinus pressure and sinus pain.    Respiratory:  Positive for cough. Negative for chest tightness and shortness of breath.    Neurological:  Negative for headaches.            Objective           Vitals:  No vitals were obtained today due to virtual visit.    Physical Exam   healthy, alert, and no distress  PSYCH: Alert and oriented times 3; coherent speech, normal   rate and volume, able to articulate logical thoughts, able   to abstract reason, no tangential thoughts, no hallucinations   or delusions  Her " affect is normal  RESP: No cough, no audible wheezing, able to talk in full sentences  Remainder of exam unable to be completed due to telephone visits                Phone call duration: 5 minutes

## 2023-09-21 RX ORDER — FLUCONAZOLE 150 MG/1
150 TABLET ORAL ONCE
Qty: 1 TABLET | Refills: 0 | Status: SHIPPED | OUTPATIENT
Start: 2023-09-21 | End: 2023-09-21

## 2024-02-21 ENCOUNTER — VIRTUAL VISIT (OUTPATIENT)
Dept: FAMILY MEDICINE | Facility: CLINIC | Age: 49
End: 2024-02-21
Payer: COMMERCIAL

## 2024-02-21 DIAGNOSIS — B37.31 YEAST INFECTION OF THE VAGINA: ICD-10-CM

## 2024-02-21 DIAGNOSIS — J01.01 ACUTE RECURRENT MAXILLARY SINUSITIS: Primary | ICD-10-CM

## 2024-02-21 DIAGNOSIS — H10.31 ACUTE BACTERIAL CONJUNCTIVITIS OF RIGHT EYE: ICD-10-CM

## 2024-02-21 PROCEDURE — 99213 OFFICE O/P EST LOW 20 MIN: CPT | Mod: 95 | Performed by: PHYSICIAN ASSISTANT

## 2024-02-21 RX ORDER — CEFDINIR 300 MG/1
300 CAPSULE ORAL 2 TIMES DAILY
Qty: 20 CAPSULE | Refills: 0 | Status: SHIPPED | OUTPATIENT
Start: 2024-02-21 | End: 2024-04-16

## 2024-02-21 RX ORDER — FLUCONAZOLE 150 MG/1
150 TABLET ORAL ONCE
Qty: 1 TABLET | Refills: 0 | Status: SHIPPED | OUTPATIENT
Start: 2024-02-21 | End: 2024-02-21

## 2024-02-21 RX ORDER — CIPROFLOXACIN HYDROCHLORIDE 3.5 MG/ML
1 SOLUTION/ DROPS TOPICAL 4 TIMES DAILY
Qty: 10 ML | Refills: 0 | Status: SHIPPED | OUTPATIENT
Start: 2024-02-21 | End: 2024-02-26

## 2024-02-21 ASSESSMENT — ENCOUNTER SYMPTOMS
COUGH: 1
SORE THROAT: 1
EYE PAIN: 1

## 2024-02-21 NOTE — PROGRESS NOTES
"Liberty is a 49 year old who is being evaluated via a billable video visit.      How would you like to obtain your AVS? MyChart  If the video visit is dropped, the invitation should be resent by: Text to cell phone: [phone]  Will anyone else be joining your video visit? No          Assessment & Plan     (J01.01) Acute recurrent maxillary sinusitis  (primary encounter diagnosis)  Comment: Cefdinir 300 mg once daily for 10 days fluids  Plan: cefdinir (OMNICEF) 300 MG capsule            (B37.31) Yeast infection of the vagina  Comment: Cover as she gets yeast vaginitis from antibiotics  Plan: fluconazole (DIFLUCAN) 150 MG tablet            (H10.31) Acute bacterial conjunctivitis of right eye  Comment: Compresses drops follow-up if eye pain decreased visual acuity  Plan: ciprofloxacin (CILOXAN) 0.3 % ophthalmic         solution                    BMI  Estimated body mass index is 27.05 kg/m  as calculated from the following:    Height as of 8/24/23: 1.575 m (5' 2\").    Weight as of 8/24/23: 67.1 kg (147 lb 14.4 oz).             Subjective   Liberty is a 49 year old, presenting for the following health issues:  Cough (Hoarseness in voice ), Pharyngitis, and Eye Problem (Right eyelid swollen with drainage )        2/21/2024     9:53 AM   Additional Questions   Roomed by BREONNA Castle     49-year-old URI symptoms since January  No fever no chills  No severe headache  Now more sinus congestion pressure  She also has right eye that is itchy and mattery  It is not painful  Vision is fine history of recurrent sinusitis    Cough  Associated symptoms include sore throat.   Pharyngitis   Associated symptoms include cough.   Eye Problem                      Objective           Vitals:  No vitals were obtained today due to virtual visit.    Physical Exam   GENERAL: alert and no distress  EYES: Right eye is injected    RESP: No audible wheeze, cough, or visible cyanosis.    SKIN: Visible skin clear. No significant rash, abnormal " pigmentation or lesions.  NEURO: Cranial nerves grossly intact.  Mentation and speech appropriate for age.  PSYCH: Appropriate affect, tone, and pace of words          Video-Visit Details    Type of service:  Video Visit     Originating Location (pt. Location): Home    Distant Location (provider location):  On-site  Platform used for Video Visit: Elisa  Signed Electronically by: DENNIS Jones

## 2024-04-16 ENCOUNTER — OFFICE VISIT (OUTPATIENT)
Dept: FAMILY MEDICINE | Facility: CLINIC | Age: 49
End: 2024-04-16
Payer: COMMERCIAL

## 2024-04-16 VITALS
OXYGEN SATURATION: 97 % | WEIGHT: 145.7 LBS | BODY MASS INDEX: 26.81 KG/M2 | HEIGHT: 62 IN | HEART RATE: 100 BPM | RESPIRATION RATE: 16 BRPM | TEMPERATURE: 101.5 F | SYSTOLIC BLOOD PRESSURE: 118 MMHG | DIASTOLIC BLOOD PRESSURE: 72 MMHG

## 2024-04-16 DIAGNOSIS — R09.81 STUFFY NOSE: ICD-10-CM

## 2024-04-16 DIAGNOSIS — J10.1 INFLUENZA A: Primary | ICD-10-CM

## 2024-04-16 DIAGNOSIS — R50.81 FEVER IN OTHER DISEASES: ICD-10-CM

## 2024-04-16 LAB
FLUAV AG SPEC QL IA: POSITIVE
FLUBV AG SPEC QL IA: NEGATIVE

## 2024-04-16 PROCEDURE — 87804 INFLUENZA ASSAY W/OPTIC: CPT | Mod: QW | Performed by: NURSE PRACTITIONER

## 2024-04-16 PROCEDURE — 99213 OFFICE O/P EST LOW 20 MIN: CPT | Performed by: NURSE PRACTITIONER

## 2024-04-16 RX ORDER — OSELTAMIVIR PHOSPHATE 75 MG/1
75 CAPSULE ORAL 2 TIMES DAILY
Qty: 10 CAPSULE | Refills: 0 | Status: SHIPPED | OUTPATIENT
Start: 2024-04-16 | End: 2024-04-21

## 2024-04-16 ASSESSMENT — PATIENT HEALTH QUESTIONNAIRE - PHQ9
SUM OF ALL RESPONSES TO PHQ QUESTIONS 1-9: 0
10. IF YOU CHECKED OFF ANY PROBLEMS, HOW DIFFICULT HAVE THESE PROBLEMS MADE IT FOR YOU TO DO YOUR WORK, TAKE CARE OF THINGS AT HOME, OR GET ALONG WITH OTHER PEOPLE: NOT DIFFICULT AT ALL
SUM OF ALL RESPONSES TO PHQ QUESTIONS 1-9: 0

## 2024-04-16 NOTE — PROGRESS NOTES
"  Assessment & Plan     Influenza A  Flu test positive for influenza A.  She was exposed over the weekend.  Symptoms started within 48 hours.  Discussed treatment with Tamiflu and common side effects, she would like to proceed with Tamiflu treatment.  Advised she should stay home from work until she is fever free for 24 hours without medication and feeling better.  Work note provided.  - oseltamivir (TAMIFLU) 75 MG capsule; Take 1 capsule (75 mg) by mouth 2 times daily for 5 days    Fever  - Influenza A & B Antigen - Clinic Collect    Stuffy nose  - Influenza A & B Antigen - Clinic Collect            BMI  Estimated body mass index is 26.65 kg/m  as calculated from the following:    Height as of this encounter: 1.575 m (5' 2\").    Weight as of this encounter: 66.1 kg (145 lb 11.2 oz).             Tod Koenig is a 49 year old, presenting for the following health issues:  Flu (Exposed to Influenza A and now having sx x 2 days)      4/16/2024     4:35 PM   Additional Questions   Roomed by LAURA Patricia     Liberty presents today for influenza symptoms with exposure to influenza A over the weekend.  Symptoms started within the last 48 hours.  She feels chilled, fever, body aches, fatigue, cough and headache.  Taking tylenol cold and flu with mild improvement. No history of asthma or COPD. NO DM.  She is a former smoker.       History of Present Illness       Reason for visit:  Flu sx  Symptom onset:  1-3 days ago  Symptom intensity:  Moderate  Symptom progression:  Staying the same  Had these symptoms before:  No    She eats 2-3 servings of fruits and vegetables daily.She consumes 1 sweetened beverage(s) daily.She exercises with enough effort to increase her heart rate 9 or less minutes per day.  She exercises with enough effort to increase her heart rate 3 or less days per week.   She is taking medications regularly.                 Review of Systems  Constitutional, HEENT, cardiovascular, pulmonary, gi and gu systems " are negative, except as otherwise noted.      Objective    LMP 02/07/2002 (Exact Date)   There is no height or weight on file to calculate BMI.  Physical Exam  Constitutional:       Appearance: She is ill-appearing.   HENT:      Head: Normocephalic and atraumatic.      Right Ear: Tympanic membrane normal.      Left Ear: Tympanic membrane normal.      Nose: No congestion or rhinorrhea.      Mouth/Throat:      Mouth: Mucous membranes are moist.      Pharynx: No oropharyngeal exudate or posterior oropharyngeal erythema.   Cardiovascular:      Rate and Rhythm: Normal rate and regular rhythm.   Pulmonary:      Effort: Pulmonary effort is normal.      Breath sounds: Normal breath sounds. No wheezing, rhonchi or rales.   Lymphadenopathy:      Cervical: No cervical adenopathy.   Skin:     General: Skin is warm and dry.   Neurological:      Mental Status: She is alert and oriented to person, place, and time.   Psychiatric:         Mood and Affect: Mood normal.         Behavior: Behavior normal.                    Signed Electronically by: BREE Reynolds CNP

## 2024-04-16 NOTE — LETTER
April 16, 2024      Liberty Williamson  [address] [place] [id number]        To Whom It May Concern:    Liberty Williamson  was seen on 4/16/24.  Please excuse her until 4/18/24 or until she is fever free X 24 hours without medications and she is feeling better due to illness.        Sincerely,        BREE Reynolds CNP

## 2024-05-02 ENCOUNTER — OFFICE VISIT (OUTPATIENT)
Dept: FAMILY MEDICINE | Facility: CLINIC | Age: 49
End: 2024-05-02
Payer: COMMERCIAL

## 2024-05-02 ENCOUNTER — ORDERS ONLY (AUTO-RELEASED) (OUTPATIENT)
Dept: FAMILY MEDICINE | Facility: CLINIC | Age: 49
End: 2024-05-02

## 2024-05-02 VITALS
WEIGHT: 147.1 LBS | HEIGHT: 62 IN | HEART RATE: 63 BPM | TEMPERATURE: 97.4 F | DIASTOLIC BLOOD PRESSURE: 71 MMHG | RESPIRATION RATE: 18 BRPM | BODY MASS INDEX: 27.07 KG/M2 | SYSTOLIC BLOOD PRESSURE: 103 MMHG

## 2024-05-02 DIAGNOSIS — Z13.220 SCREENING FOR LIPID DISORDERS: ICD-10-CM

## 2024-05-02 DIAGNOSIS — Z12.11 SCREEN FOR COLON CANCER: ICD-10-CM

## 2024-05-02 DIAGNOSIS — Z90.710 H/O HYSTERECTOMY FOR BENIGN DISEASE: ICD-10-CM

## 2024-05-02 DIAGNOSIS — J10.1 INFLUENZA A: ICD-10-CM

## 2024-05-02 DIAGNOSIS — L98.9 SKIN LESION: ICD-10-CM

## 2024-05-02 DIAGNOSIS — R68.89 COLD INTOLERANCE: ICD-10-CM

## 2024-05-02 DIAGNOSIS — Z12.11 SCREEN FOR COLON CANCER: Primary | ICD-10-CM

## 2024-05-02 DIAGNOSIS — Z12.31 VISIT FOR SCREENING MAMMOGRAM: ICD-10-CM

## 2024-05-02 DIAGNOSIS — R53.83 OTHER FATIGUE: ICD-10-CM

## 2024-05-02 LAB
BASOPHILS # BLD AUTO: 0 10E3/UL (ref 0–0.2)
BASOPHILS NFR BLD AUTO: 1 %
EOSINOPHIL # BLD AUTO: 0.1 10E3/UL (ref 0–0.7)
EOSINOPHIL NFR BLD AUTO: 2 %
ERYTHROCYTE [DISTWIDTH] IN BLOOD BY AUTOMATED COUNT: 12.6 % (ref 10–15)
HCT VFR BLD AUTO: 39 % (ref 35–47)
HGB BLD-MCNC: 12.8 G/DL (ref 11.7–15.7)
IMM GRANULOCYTES # BLD: 0 10E3/UL
IMM GRANULOCYTES NFR BLD: 0 %
LYMPHOCYTES # BLD AUTO: 1.9 10E3/UL (ref 0.8–5.3)
LYMPHOCYTES NFR BLD AUTO: 29 %
MCH RBC QN AUTO: 28.8 PG (ref 26.5–33)
MCHC RBC AUTO-ENTMCNC: 32.8 G/DL (ref 31.5–36.5)
MCV RBC AUTO: 88 FL (ref 78–100)
MONOCYTES # BLD AUTO: 0.4 10E3/UL (ref 0–1.3)
MONOCYTES NFR BLD AUTO: 7 %
NEUTROPHILS # BLD AUTO: 3.9 10E3/UL (ref 1.6–8.3)
NEUTROPHILS NFR BLD AUTO: 61 %
PLATELET # BLD AUTO: 347 10E3/UL (ref 150–450)
RBC # BLD AUTO: 4.44 10E6/UL (ref 3.8–5.2)
WBC # BLD AUTO: 6.4 10E3/UL (ref 4–11)

## 2024-05-02 PROCEDURE — 84443 ASSAY THYROID STIM HORMONE: CPT | Performed by: NURSE PRACTITIONER

## 2024-05-02 PROCEDURE — 17003 DESTRUCT PREMALG LES 2-14: CPT | Performed by: NURSE PRACTITIONER

## 2024-05-02 PROCEDURE — 90746 HEPB VACCINE 3 DOSE ADULT IM: CPT | Performed by: NURSE PRACTITIONER

## 2024-05-02 PROCEDURE — 85025 COMPLETE CBC W/AUTO DIFF WBC: CPT | Mod: QW | Performed by: NURSE PRACTITIONER

## 2024-05-02 PROCEDURE — 36415 COLL VENOUS BLD VENIPUNCTURE: CPT | Performed by: NURSE PRACTITIONER

## 2024-05-02 PROCEDURE — 80061 LIPID PANEL: CPT | Performed by: NURSE PRACTITIONER

## 2024-05-02 PROCEDURE — 90715 TDAP VACCINE 7 YRS/> IM: CPT | Performed by: NURSE PRACTITIONER

## 2024-05-02 PROCEDURE — 90471 IMMUNIZATION ADMIN: CPT | Performed by: NURSE PRACTITIONER

## 2024-05-02 PROCEDURE — 17000 DESTRUCT PREMALG LESION: CPT | Performed by: NURSE PRACTITIONER

## 2024-05-02 PROCEDURE — 90472 IMMUNIZATION ADMIN EACH ADD: CPT | Performed by: NURSE PRACTITIONER

## 2024-05-02 PROCEDURE — 80048 BASIC METABOLIC PNL TOTAL CA: CPT | Performed by: NURSE PRACTITIONER

## 2024-05-02 PROCEDURE — 99214 OFFICE O/P EST MOD 30 MIN: CPT | Mod: 25 | Performed by: NURSE PRACTITIONER

## 2024-05-02 NOTE — PROGRESS NOTES
Assessment & Plan     Influenza A  Short-term disability paperwork completed.  She missed work due to symptoms of influenza A from 4/16/2024 through 4/21/2024, return to work without restrictions on 4/22/2024.    Screen for colon cancer  She is agreeable to Cologuard for colon cancer screening  - COLOGUARD([employer]); Future    Visit for screening mammogram  Would like to have mammogram completed at [facility], she can call to schedule appointment at her convenience.  - MA SCREENING DIGITAL BILAT - Future  (s+30); Future    Other fatigue  Recommend lab work as below.  Could be related to sleep disturbance due to work schedule.  Other differentials under consideration include thyroid disorder, anemia.  Also check metabolic panel for blood sugar, kidney function electrolytes.  - TSH with free T4 reflex  - CBC with Platelets & Differential  - Basic metabolic panel    Cold intolerance  Has noticed several months of cold intolerance.  Will check TSH and CBC today.  Last TSH a couple years ago was within normal limits.  - TSH with free T4 reflex; Future  - CBC with Platelets & Differential; Future  - TSH with free T4 reflex  - CBC with Platelets & Differential    H/O hysterectomy for benign disease  She is status post hysterectomy for benign reasons.  Pap smear not indicated.    Screening for lipid disorders  - Lipid panel reflex to direct LDL Non-fasting; Future  - Lipid panel reflex to direct LDL Non-fasting    Skin lesion  She has 2 skin lesions on her left forearm that she noticed within the last month.  Lesions can be itchy.  Most consistent with actinic keratosis or warty lesion.  Verbal consent obtained for cryotherapy and potential side effects reviewed such as pain, blistering, scarring, bleeding.  Treated with freeze-and-thaw cycles of cryotherapy totaling 10 seconds for each lesion without any complications.  She tolerated procedure well.  If lesion does not resolve, recommend follow-up visit  "for shave biopsy.            BMI  Estimated body mass index is 26.9 kg/m  as calculated from the following:    Height as of this encounter: 1.575 m (5' 2\").    Weight as of this encounter: 66.7 kg (147 lb 1.6 oz).             Subjective   Liberty is a 49 year old, presenting for the following health issues:  Forms (Needs forms for short term disability filled out.)      5/2/2024     4:16 PM   Additional Questions   Roomed by Mary-Student Mary-Student         5/2/2024   Forms   Any forms needing to be completed Yes     Liberty is a pleasant 49-year-old female who presents for follow-up for influenza and for associated short-term disability paperwork.  She was diagnosed on 4/16/2024 with influenza A, treated with Tamiflu and is feeling better.  She is back to work.  She missed work from 4/16/2024 through 4/22/2024 for symptoms associated with influenza including fever, fatigue and cough.    She is interested in discussing other symptoms including always feeling tired and feeling cold.  Has been going on for a while.  She has no energy to get any thing done after she gets home from work.  She does work 3 AM until 2:30 PM, so is getting up very early in the morning.        History of Present Illness       Reason for visit:  Paper work    She eats 0-1 servings of fruits and vegetables daily.She consumes 1 sweetened beverage(s) daily.She exercises with enough effort to increase her heart rate 9 or less minutes per day.  She exercises with enough effort to increase her heart rate 3 or less days per week.   She is taking medications regularly.                 Review of Systems   Constitutional:  Positive for fatigue. Negative for chills, fever and unexpected weight change.   HENT: Negative.     Respiratory: Negative.     Cardiovascular: Negative.    Gastrointestinal: Negative.  Negative for constipation.   Endocrine: Positive for cold intolerance.   Skin: Negative.            Objective    /71 (BP Location: Right arm, " "Patient Position: Sitting, Cuff Size: Adult Regular)   Pulse 63   Temp 97.4  F (36.3  C) (Tympanic)   Resp 18   Ht 1.575 m (5' 2\")   Wt 66.7 kg (147 lb 1.6 oz)   LMP 02/07/2002 (Exact Date)   BMI 26.90 kg/m    Body mass index is 26.9 kg/m .  Physical Exam  Constitutional:       Appearance: Normal appearance. She is not ill-appearing.   HENT:      Head: Normocephalic and atraumatic.      Nose: No congestion or rhinorrhea.   Neck:      Comments: No thyromegaly  Cardiovascular:      Rate and Rhythm: Normal rate and regular rhythm.   Pulmonary:      Effort: Pulmonary effort is normal.      Breath sounds: Normal breath sounds.   Skin:     General: Skin is warm and dry.      Capillary Refill: Capillary refill takes less than 2 seconds.             Comments: Keratotic raised lesions on erythematous base on left forearm   Neurological:      Mental Status: She is alert and oriented to person, place, and time.   Psychiatric:         Mood and Affect: Mood normal.         Behavior: Behavior normal.                              Signed Electronically by: BREE Reynolds CNP    "

## 2024-05-02 NOTE — LETTER
"May 16, 2024      Liberty Williamson  [address] [place] [phone]        Dear ,    We are writing to inform you of your test results.    Cholesterol is mildly elevated.  LDL cholesterol is at 121 (high normal ) and goal is less than 100.  Recommend heart healthy diet such as \"Mediterranean diet\".  Please let us know if you would like more information about that.  Recommend regular physical activity.     TSH, thyroid test, is normal.  Kidney function, electrolytes and blood sugar are also within normal limits.     Please let us know if you have any questions or concerns.    Resulted Orders   TSH with free T4 reflex   Result Value Ref Range    TSH 0.98 0.30 - 4.20 uIU/mL   Basic metabolic panel   Result Value Ref Range    Sodium 141 135 - 145 mmol/L      Comment:      Reference intervals for this test were updated on 09/26/2023 to more accurately reflect our healthy population. There may be differences in the flagging of prior results with similar values performed with this method. Interpretation of those prior results can be made in the context of the updated reference intervals.     Potassium 4.3 3.4 - 5.3 mmol/L    Chloride 106 98 - 107 mmol/L    Carbon Dioxide (CO2) 23 22 - 29 mmol/L    Anion Gap 12 7 - 15 mmol/L    Urea Nitrogen 15.7 6.0 - 20.0 mg/dL    Creatinine 0.71 0.51 - 0.95 mg/dL    GFR Estimate >90 >60 mL/min/1.73m2    Calcium 9.7 8.6 - 10.0 mg/dL    Glucose 97 70 - 99 mg/dL   CBC with platelets and differential   Result Value Ref Range    WBC Count 6.4 4.0 - 11.0 10e3/uL    RBC Count 4.44 3.80 - 5.20 10e6/uL    Hemoglobin 12.8 11.7 - 15.7 g/dL    Hematocrit 39.0 35.0 - 47.0 %    MCV 88 78 - 100 fL    MCH 28.8 26.5 - 33.0 pg    MCHC 32.8 31.5 - 36.5 g/dL    RDW 12.6 10.0 - 15.0 %    Platelet Count 347 150 - 450 10e3/uL    % Neutrophils 61 %    % Lymphocytes 29 %    % Monocytes 7 %    % Eosinophils 2 %    % Basophils 1 %    % Immature Granulocytes 0 %    Absolute Neutrophils 3.9 1.6 " - 8.3 10e3/uL    Absolute Lymphocytes 1.9 0.8 - 5.3 10e3/uL    Absolute Monocytes 0.4 0.0 - 1.3 10e3/uL    Absolute Eosinophils 0.1 0.0 - 0.7 10e3/uL    Absolute Basophils 0.0 0.0 - 0.2 10e3/uL    Absolute Immature Granulocytes 0.0 <=0.4 10e3/uL       If you have any questions or concerns, please call the clinic at the number listed above.       Sincerely,      BREE Dow CNP

## 2024-05-03 ASSESSMENT — ENCOUNTER SYMPTOMS
UNEXPECTED WEIGHT CHANGE: 0
CONSTIPATION: 0
FATIGUE: 1
CHILLS: 0
RESPIRATORY NEGATIVE: 1
GASTROINTESTINAL NEGATIVE: 1
FEVER: 0
CARDIOVASCULAR NEGATIVE: 1

## 2024-05-04 LAB
ANION GAP SERPL CALCULATED.3IONS-SCNC: 12 MMOL/L (ref 7–15)
BUN SERPL-MCNC: 15.7 MG/DL (ref 6–20)
CALCIUM SERPL-MCNC: 9.7 MG/DL (ref 8.6–10)
CHLORIDE SERPL-SCNC: 106 MMOL/L (ref 98–107)
CHOLEST SERPL-MCNC: 209 MG/DL
CREAT SERPL-MCNC: 0.71 MG/DL (ref 0.51–0.95)
DEPRECATED HCO3 PLAS-SCNC: 23 MMOL/L (ref 22–29)
EGFRCR SERPLBLD CKD-EPI 2021: >90 ML/MIN/1.73M2
FASTING STATUS PATIENT QL REPORTED: NO
GLUCOSE SERPL-MCNC: 97 MG/DL (ref 70–99)
HDLC SERPL-MCNC: 62 MG/DL
LDLC SERPL CALC-MCNC: 121 MG/DL
NONHDLC SERPL-MCNC: 147 MG/DL
POTASSIUM SERPL-SCNC: 4.3 MMOL/L (ref 3.4–5.3)
SODIUM SERPL-SCNC: 141 MMOL/L (ref 135–145)
TRIGL SERPL-MCNC: 131 MG/DL
TSH SERPL DL<=0.005 MIU/L-ACNC: 0.98 UIU/ML (ref 0.3–4.2)

## 2024-05-17 LAB — NONINV COLON CA DNA+OCC BLD SCRN STL QL: NEGATIVE

## 2024-06-24 ENCOUNTER — E-VISIT (OUTPATIENT)
Dept: FAMILY MEDICINE | Facility: CLINIC | Age: 49
End: 2024-06-24
Payer: COMMERCIAL

## 2024-06-24 DIAGNOSIS — H10.30 ACUTE BACTERIAL CONJUNCTIVITIS, UNSPECIFIED LATERALITY: Primary | ICD-10-CM

## 2024-06-24 PROCEDURE — 99421 OL DIG E/M SVC 5-10 MIN: CPT | Performed by: PHYSICIAN ASSISTANT

## 2024-06-24 RX ORDER — CIPROFLOXACIN HYDROCHLORIDE 3.5 MG/ML
SOLUTION/ DROPS TOPICAL
Qty: 5 ML | Refills: 0 | Status: SHIPPED | OUTPATIENT
Start: 2024-06-24 | End: 2024-07-01

## 2024-06-24 NOTE — PATIENT INSTRUCTIONS
Thank you for choosing us for your care. I have placed an order for a prescription so that you can start treatment. View your full visit summary for details by clicking on the link below. Your pharmacist will able to address any questions you may have about the medication.     If you re not feeling better within 2-3 days, please schedule an appointment.  You can schedule an appointment right here in [facility], or call [phone]  If the visit is for the same symptoms as your eVisit, we ll refund the cost of your eVisit if seen within seven days.    Pinkeye: Care Instructions  Overview     Pinkeye is redness and swelling of the eye surface and the conjunctiva (the lining of the eyelid and the covering of the white part of the eye). Pinkeye is also called conjunctivitis. Pinkeye is often caused by infection with bacteria or a virus. Dry air, allergies, smoke, and chemicals are other common causes.  Pinkeye often gets better on its own in 7 to 10 days. Antibiotics only help if the pinkeye is caused by bacteria. Pinkeye caused by infection spreads easily. If an allergy or chemical is causing pinkeye, it will not go away unless you can avoid whatever is causing it.  Follow-up care is a key part of your treatment and safety. Be sure to make and go to all appointments, and call your doctor if you are having problems. It's also a good idea to know your test results and keep a list of the medicines you take.  How can you care for yourself at home?  Wash your hands often. Always wash them before and after you treat pinkeye or touch your eyes or face.  Use moist cotton or a clean, wet cloth to remove crust. Wipe from the inside corner of the eye to the outside. Use a clean part of the cloth for each wipe.  Put cold or warm wet cloths on your eye a few times a day if the eye hurts.  Do not wear contact lenses or eye makeup until the pinkeye is gone. Throw away any eye makeup you were using when you got pinkeye. Clean your  "contacts and storage case. If you wear disposable contacts, use a new pair when your eye has cleared and it is safe to wear contacts again.  If the doctor gave you antibiotic ointment or eyedrops, use them as directed. Use the medicine for as long as instructed, even if your eye starts looking better soon. Keep the bottle tip clean, and do not let it touch the eye area.  To put in eyedrops or ointment:  Tilt your head back, and pull your lower eyelid down with one finger.  Drop or squirt the medicine inside the lower lid.  Close your eye for 30 to 60 seconds to let the drops or ointment move around.  Do not touch the ointment or dropper tip to your eyelashes or any other surface.  Do not share towels, pillows, or washcloths while you have pinkeye.  When should you call for help?   Call your doctor now or seek immediate medical care if:    You have pain in your eye, not just irritation on the surface.     You have a change in vision or loss of vision.     You have an increase in discharge from the eye.     Your eye has not started to improve or begins to get worse within 48 hours after you start using antibiotics.     Pinkeye lasts longer than 7 days.   Watch closely for changes in your health, and be sure to contact your doctor if you have any problems.  Where can you learn more?  Go to https://www.[employer].net/patiented  Enter Y392 in the search box to learn more about \"Pinkeye: Care Instructions.\"  Current as of: June 5, 2023               Content Version: 14.0    5191-1791 [employer].   Care instructions adapted under license by your healthcare professional. If you have questions about a medical condition or this instruction, always ask your healthcare professional. [employer] disclaims any warranty or liability for your use of this information.      "

## 2024-07-02 ENCOUNTER — MYC MEDICAL ADVICE (OUTPATIENT)
Dept: FAMILY MEDICINE | Facility: CLINIC | Age: 49
End: 2024-07-02
Payer: COMMERCIAL

## 2024-08-25 ENCOUNTER — HEALTH MAINTENANCE LETTER (OUTPATIENT)
Age: 49
End: 2024-08-25

## 2024-09-17 ENCOUNTER — E-VISIT (OUTPATIENT)
Dept: FAMILY MEDICINE | Facility: CLINIC | Age: 49
End: 2024-09-17
Payer: COMMERCIAL

## 2024-09-17 DIAGNOSIS — J01.01 ACUTE RECURRENT MAXILLARY SINUSITIS: Primary | ICD-10-CM

## 2024-09-17 PROCEDURE — 99213 OFFICE O/P EST LOW 20 MIN: CPT | Performed by: PHYSICIAN ASSISTANT

## 2024-09-17 RX ORDER — CEFDINIR 300 MG/1
300 CAPSULE ORAL 2 TIMES DAILY
Qty: 20 CAPSULE | Refills: 0 | Status: SHIPPED | OUTPATIENT
Start: 2024-09-17

## 2024-09-17 RX ORDER — FLUCONAZOLE 150 MG/1
150 TABLET ORAL ONCE
Qty: 1 TABLET | Refills: 0 | Status: SHIPPED | OUTPATIENT
Start: 2024-09-17 | End: 2024-09-17

## 2024-09-24 ENCOUNTER — MYC MEDICAL ADVICE (OUTPATIENT)
Dept: FAMILY MEDICINE | Facility: CLINIC | Age: 49
End: 2024-09-24
Payer: COMMERCIAL

## 2024-09-25 NOTE — TELEPHONE ENCOUNTER
Forms/Letter Request    Type of form/letter: FMLA - Block    Date Range:  09/16/2024-09/19/2024    Is Release of Information needed?: No    Do we have the form/letter: Yes: via [employer]    Who is the form from? Patient    Where did/will the form come from? form was sent via [employer]    When is form/letter needed by: asap    How would you like the form/letter returned: Fax : [phone]    Patient Notified form requests are processed in 5-7 business days:Yes    Could we send this information to you in [employer] or would you prefer to receive a phone call?:   Patient would like to be contacted via [employer]

## 2024-11-01 ENCOUNTER — OFFICE VISIT (OUTPATIENT)
Dept: FAMILY MEDICINE | Facility: CLINIC | Age: 49
End: 2024-11-01
Payer: COMMERCIAL

## 2024-11-01 VITALS
DIASTOLIC BLOOD PRESSURE: 68 MMHG | TEMPERATURE: 98.1 F | SYSTOLIC BLOOD PRESSURE: 108 MMHG | HEIGHT: 62 IN | RESPIRATION RATE: 16 BRPM | HEART RATE: 73 BPM | WEIGHT: 146.7 LBS | BODY MASS INDEX: 27 KG/M2 | OXYGEN SATURATION: 98 %

## 2024-11-01 DIAGNOSIS — F41.1 GAD (GENERALIZED ANXIETY DISORDER): ICD-10-CM

## 2024-11-01 DIAGNOSIS — K21.9 GASTROESOPHAGEAL REFLUX DISEASE WITHOUT ESOPHAGITIS: ICD-10-CM

## 2024-11-01 DIAGNOSIS — Z00.00 ANNUAL PHYSICAL EXAM: Primary | ICD-10-CM

## 2024-11-01 DIAGNOSIS — F33.9 RECURRENT MAJOR DEPRESSIVE EPISODES (H): ICD-10-CM

## 2024-11-01 DIAGNOSIS — J30.2 SEASONAL ALLERGIC RHINITIS, UNSPECIFIED TRIGGER: ICD-10-CM

## 2024-11-01 DIAGNOSIS — G47.09 OTHER INSOMNIA: ICD-10-CM

## 2024-11-01 DIAGNOSIS — Z12.31 VISIT FOR SCREENING MAMMOGRAM: ICD-10-CM

## 2024-11-01 DIAGNOSIS — L98.9 SKIN LESION: ICD-10-CM

## 2024-11-01 PROCEDURE — 90471 IMMUNIZATION ADMIN: CPT | Performed by: PHYSICIAN ASSISTANT

## 2024-11-01 PROCEDURE — 99214 OFFICE O/P EST MOD 30 MIN: CPT | Mod: 25 | Performed by: PHYSICIAN ASSISTANT

## 2024-11-01 PROCEDURE — 90656 IIV3 VACC NO PRSV 0.5 ML IM: CPT | Performed by: PHYSICIAN ASSISTANT

## 2024-11-01 PROCEDURE — 99396 PREV VISIT EST AGE 40-64: CPT | Mod: 25 | Performed by: PHYSICIAN ASSISTANT

## 2024-11-01 RX ORDER — LEVOCETIRIZINE DIHYDROCHLORIDE 5 MG/1
5 TABLET, FILM COATED ORAL EVERY MORNING
Qty: 90 TABLET | Refills: 3 | Status: SHIPPED | OUTPATIENT
Start: 2024-11-01

## 2024-11-01 RX ORDER — TRAZODONE HYDROCHLORIDE 50 MG/1
50 TABLET, FILM COATED ORAL AT BEDTIME
Qty: 30 TABLET | Refills: 0 | Status: SHIPPED | OUTPATIENT
Start: 2024-11-01

## 2024-11-01 RX ORDER — PANTOPRAZOLE SODIUM 40 MG/1
40 TABLET, DELAYED RELEASE ORAL DAILY
Qty: 90 TABLET | Refills: 3 | Status: SHIPPED | OUTPATIENT
Start: 2024-11-01

## 2024-11-01 RX ORDER — ESCITALOPRAM OXALATE 10 MG/1
10 TABLET ORAL DAILY
Qty: 90 TABLET | Refills: 3 | Status: SHIPPED | OUTPATIENT
Start: 2024-11-01

## 2024-11-01 RX ORDER — FAMOTIDINE 20 MG/1
20 TABLET, FILM COATED ORAL 2 TIMES DAILY
Qty: 60 TABLET | Refills: 11 | Status: CANCELLED | OUTPATIENT
Start: 2024-11-01

## 2024-11-01 SDOH — HEALTH STABILITY: PHYSICAL HEALTH: ON AVERAGE, HOW MANY MINUTES DO YOU ENGAGE IN EXERCISE AT THIS LEVEL?: 10 MIN

## 2024-11-01 SDOH — HEALTH STABILITY: PHYSICAL HEALTH: ON AVERAGE, HOW MANY DAYS PER WEEK DO YOU ENGAGE IN MODERATE TO STRENUOUS EXERCISE (LIKE A BRISK WALK)?: 4 DAYS

## 2024-11-01 ASSESSMENT — SOCIAL DETERMINANTS OF HEALTH (SDOH): HOW OFTEN DO YOU GET TOGETHER WITH FRIENDS OR RELATIVES?: TWICE A WEEK

## 2024-11-01 NOTE — PROGRESS NOTES
"Preventive Care Visit  [facility]  DENNIS Jones, Family Medicine  Nov 1, 2024      Assessment & Plan     (Z00.00) Annual physical exam  (primary encounter diagnosis)  Comment: No acute findings  Plan: Return in 1 year and as needed    (J30.2) Seasonal allergic rhinitis, unspecified trigger  Comment: Symptomatic  Plan: levocetirizine (XYZAL) 5 MG tablet        Counter medication    (K21.9) Gastroesophageal reflux disease without esophagitis  Comment: not as well-controlled with Pepcid  Plan: pantoprazole (PROTONIX) 40 MG EC tablet        Will put back on pantoprazole    (F41.1) JESUS (generalized anxiety disorder)  Comment: More symptoms off Lexapro  Plan: escitalopram (LEXAPRO) 10 MG tablet        Will restart    (G47.09) Other insomnia  Comment: Discussed options  Plan: traZODone (DESYREL) 50 MG tablet        Will try trazodone as needed    (L98.9) Skin lesion  Comment: Treated see above  Plan: MA Screen Bilateral w/Mark            (Z12.31) Visit for screening mammogram  Comment: Will get mammogram  Plan:     (F33.9) Recurrent major depressive episodes (H)  Comment: No acute findings  Plan: Restart Lexapro            BMI  Estimated body mass index is 26.83 kg/m  as calculated from the following:    Height as of this encounter: 1.575 m (5' 2\").    Weight as of this encounter: 66.5 kg (146 lb 11.2 oz).       Counseling  Appropriate preventive services were addressed with this patient via screening, questionnaire, or discussion as appropriate for fall prevention, nutrition, physical activity, Tobacco-use cessation, social engagement, weight loss and cognition.  Checklist reviewing preventive services available has been given to the patient.  Reviewed patient's diet, addressing concerns and/or questions.   The patient's PHQ-9 score is consistent with mild depression. She was provided with information regarding depression.           Tod Koenig is a 49 year old, presenting for the " following:  Physical        11/1/2024    10:25 AM   Additional Questions   Roomed by BREONNA Castle         11/1/2024   Forms   Any forms needing to be completed Yes             And is here for preventive visit  She has a skin lesion on her left forearm  She had 2 lesions there that were treated with liquid nitrogen a few months back by another provider 1 resolved the other 1 that persists if she would like to have it retreated she understands the treatment options  She had hysterectomy for benign reasons no longer requires Pap smears  Had lipid panel earlier this month  She needs FMLA paperwork filled out due to recent missing work due to some insomnia  She has been out of her medications for some time prior to that she was doing well with her emotional state except for periodic insomnia  She has tried melatonin without success  She did not feel the Pepcid was working as well for her GERD as the Protonix and is requesting to go back  She is requesting a mammogram declines clinical breast exam  Recently treated for sinusitis though symptoms are pretty much resolved  She knows she has seasonal allergies and has been out of her Xyzal    Answers submitted by the patient for this visit:  Patient Health Questionnaire (Submitted on 11/1/2024)  If you checked off any problems, how difficult have these problems made it for you to do your work, take care of things at home, or get along with other people?: Somewhat difficult  PHQ9 TOTAL SCORE: 6            Health Care Directive  Patient does not have a Health Care Directive:       11/1/2024   General Health   How would you rate your overall physical health? (!) FAIR   Feel stress (tense, anxious, or unable to sleep) Rather much      (!) STRESS CONCERN      11/1/2024   Nutrition   Three or more servings of calcium each day? Yes   Diet: Regular (no restrictions)   How many servings of fruit and vegetables per day? (!) 2-3   How many sweetened beverages each day? 0-1             11/1/2024   Exercise   Days per week of moderate/strenous exercise 4 days   Average minutes spent exercising at this level 10 min            11/1/2024   Social Factors   Frequency of gathering with friends or relatives Twice a week   Worry food won't last until get money to buy more No   Food not last or not have enough money for food? No   Do you have housing? (Housing is defined as stable permanent housing and does not include staying ouside in a car, in a tent, in an abandoned building, in an overnight shelter, or couch-surfing.) Yes   Are you worried about losing your housing? No   Lack of transportation? No   Unable to get utilities (heat,electricity)? No            11/1/2024   Dental   Dentist two times every year? Yes            11/1/2024   TB Screening   Were you born outside of the [place]? No          Today's PHQ-9 Score:       11/1/2024    10:14 AM   PHQ-9 SCORE   PHQ-9 Total Score MyChart 6 (Mild depression)   PHQ-9 Total Score 6        Patient-reported         11/1/2024   Substance Use   Alcohol more than 3/day or more than 7/wk No   Do you use any other substances recreationally? No        Social History     Tobacco Use    Smoking status: Former     Current packs/day: 0.25     Average packs/day: 0.3 packs/day for 13.0 years (3.3 ttl pk-yrs)     Types: Cigarettes     Passive exposure: Past    Smokeless tobacco: Never   Vaping Use    Vaping status: Never Used   Substance Use Topics    Alcohol use: No                    11/1/2024   One time HIV Screening   Previous HIV test? Yes          11/1/2024   STI Screening   New sexual partner(s) since last STI/HIV test? No        History of abnormal Pap smear: Status post hysterectomy with removal of cervix and no history of CIN2 or greater or cervical cancer. Health Maintenance and Surgical History updated.       ASCVD Risk   The 10-year ASCVD risk score (Bianka MIXON, et al., 2019) is: 0.7%    Values used to calculate the score:      Age: 49 years      Sex:  "Female      Is Non- : No      Diabetic: No      Tobacco smoker: No      Systolic Blood Pressure: 108 mmHg      Is BP treated: No      HDL Cholesterol: 62 mg/dL      Total Cholesterol: 209 mg/dL       Reviewed and updated as needed this visit by Provider                             Objective    Exam  /68 (BP Location: Right arm, Patient Position: Sitting, Cuff Size: Adult Regular)   Pulse 73   Temp 98.1  F (36.7  C) (Tympanic)   Resp 16   Ht 1.575 m (5' 2\")   Wt 66.5 kg (146 lb 11.2 oz)   LMP 02/07/2002 (Exact Date)   SpO2 98%   BMI 26.83 kg/m     Estimated body mass index is 26.83 kg/m  as calculated from the following:    Height as of this encounter: 1.575 m (5' 2\").    Weight as of this encounter: 66.5 kg (146 lb 11.2 oz).    Physical Exam  Vitals and nursing note reviewed.   Constitutional:       Appearance: Normal appearance.   HENT:      Head: Normocephalic and atraumatic.      Right Ear: Tympanic membrane and ear canal normal.      Left Ear: Tympanic membrane and ear canal normal.      Nose: Nose normal.      Mouth/Throat:      Mouth: Mucous membranes are moist.      Pharynx: No posterior oropharyngeal erythema.   Eyes:      Conjunctiva/sclera: Conjunctivae normal.   Cardiovascular:      Rate and Rhythm: Normal rate and regular rhythm.      Heart sounds: Normal heart sounds.   Pulmonary:      Effort: Pulmonary effort is normal.      Breath sounds: Normal breath sounds.   Abdominal:      Palpations: Abdomen is soft. There is no mass.      Tenderness: There is no abdominal tenderness.   Musculoskeletal:         General: Normal range of motion.      Cervical back: Normal range of motion and neck supple.   Lymphadenopathy:      Cervical: No cervical adenopathy.   Skin:     General: Skin is warm and dry.      Findings: No rash.      Comments: Erythematous scaly lesion 5 mm in diameter on the left forearm suspicious for actinic keratosis this lesion was treated with liquid " nitrogen in a freeze thaw freeze thaw cycle she tolerated well there were no complications she knows how to follow-up if it does not completely resolve   Neurological:      General: No focal deficit present.      Mental Status: She is alert.   Psychiatric:         Mood and Affect: Mood normal.         Behavior: Behavior normal.         Thought Content: Thought content normal.         Judgment: Judgment normal.               Signed Electronically by: DENNIS Jones

## 2024-11-04 ENCOUNTER — PATIENT OUTREACH (OUTPATIENT)
Dept: CARE COORDINATION | Facility: CLINIC | Age: 49
End: 2024-11-04
Payer: COMMERCIAL

## 2024-11-18 ENCOUNTER — TELEPHONE (OUTPATIENT)
Dept: FAMILY MEDICINE | Facility: CLINIC | Age: 49
End: 2024-11-18
Payer: COMMERCIAL

## 2024-11-18 NOTE — TELEPHONE ENCOUNTER
Forms were updated, spoke with patient and she requested that copy be mailed to her. Confirmed address in chart.

## 2024-11-18 NOTE — TELEPHONE ENCOUNTER
Forms/Letter Request    Type of form/letter: FMLA - Intermittent    Number of days per episode:  unknown  Number of episodes per month:  3-4 days - some are half days   Form needs to be updated    Do we have the form/letter: Yes: wall file    Who is the form from? Insurance comp    Where did/will the form come from? Patient or family brought in   (mother dropped off)    When is form/letter needed by: as soon as possible    How would you like the form/letter returned:  Mom did not know, she said to call [employer] @ [phone]    Patient Notified form requests are processed in 5-7 business days:Yes    Could we send this information to you in [employer] or would you prefer to receive a phone call?:   Patient would prefer a phone call   Okay to leave a detailed message?: Yes at Cell number on file:    Telephone Information:   Mobile [phone]

## 2025-01-20 ENCOUNTER — MYC REFILL (OUTPATIENT)
Dept: FAMILY MEDICINE | Facility: CLINIC | Age: 50
End: 2025-01-20
Payer: COMMERCIAL

## 2025-01-20 DIAGNOSIS — G47.09 OTHER INSOMNIA: ICD-10-CM

## 2025-01-21 RX ORDER — TRAZODONE HYDROCHLORIDE 50 MG/1
50 TABLET, FILM COATED ORAL AT BEDTIME
Qty: 30 TABLET | Refills: 0 | Status: SHIPPED | OUTPATIENT
Start: 2025-01-21

## 2025-03-06 ENCOUNTER — TELEPHONE (OUTPATIENT)
Dept: FAMILY MEDICINE | Facility: CLINIC | Age: 50
End: 2025-03-06
Payer: COMMERCIAL

## 2025-03-07 NOTE — TELEPHONE ENCOUNTER
"FMLA forms have been printed and placed in Sergei Corrae's \"wall basket\" in [place]. He is out of clinic until Monday 03/10/2025.  "

## 2025-04-02 ENCOUNTER — E-VISIT (OUTPATIENT)
Dept: FAMILY MEDICINE | Facility: CLINIC | Age: 50
End: 2025-04-02
Payer: COMMERCIAL

## 2025-04-02 DIAGNOSIS — B37.31 YEAST INFECTION OF THE VAGINA: Primary | ICD-10-CM

## 2025-04-02 RX ORDER — FLUCONAZOLE 150 MG/1
150 TABLET ORAL
Qty: 3 TABLET | Refills: 0 | Status: SHIPPED | OUTPATIENT
Start: 2025-04-02 | End: 2025-04-09

## 2025-04-02 NOTE — PATIENT INSTRUCTIONS
Vaginal Yeast Infection: Care Instructions  Overview     A vaginal yeast infection is the growth of too many yeast cells in the vagina. This is a common problem. Itching, vaginal discharge and irritation, and other symptoms can bother you. But yeast infections don't often cause other health problems.  Some medicines can increase your risk of getting a yeast infection. These include antibiotics, hormones, and steroids. You may also be more likely to get a yeast infection if you are pregnant, have diabetes, douche, or wear tight clothes.  With treatment, most yeast infections get better in a few days.  Follow-up care is a key part of your treatment and safety. Be sure to make and go to all appointments, and call your doctor if you are having problems. It's also a good idea to know your test results and keep a list of the medicines you take.  How can you care for yourself at home?  Take your medicines exactly as prescribed. Call your doctor if you think you are having a problem with your medicine.  Ask your doctor about over-the-counter (OTC) medicines for yeast infections. If you use an OTC treatment, read and follow all instructions on the label.  Don't use tampons while using a vaginal cream or suppository. The tampons can absorb the medicine. Use pads instead.  Wear loose cotton clothing. Don't wear nylon or other fabric that holds body heat and moisture close to the skin.  Try sleeping without underwear.  Don't scratch. Relieve itching with a cold pack or a cool bath.  Don't wash your vulva more than once a day. Use plain water or a mild, unscented soap. Air-dry the vulva.  Change out of wet or damp clothes as soon as possible.  If you are using a vaginal medicine, don't have sex until you have finished your treatment. But if you do have sex, don't depend on a condom or diaphragm for birth control. The oil in some vaginal medicines weakens latex.  Don't douche or use powders, sprays, or perfumes in your vagina or  "on your vulva. These items can change the normal balance of organisms in your vagina.  When should you call for help?   Call your doctor now or seek immediate medical care if:    You have new or increased pain in your vagina or pelvis.   Watch closely for changes in your health, and be sure to contact your doctor if:    You have unexpected vaginal bleeding.     You have a fever.     You are not getting better after 2 days.     Your symptoms come back after you finish your medicines.   Where can you learn more?  Go to https://www.[employer].net/patiented  Enter F639 in the search box to learn more about \"Vaginal Yeast Infection: Care Instructions.\"  Current as of: April 30, 2024  Content Version: 14.4    1748-4898 [employer].   Care instructions adapted under license by your healthcare professional. If you have questions about a medical condition or this instruction, always ask your healthcare professional. [employer] disclaims any warranty or liability for your use of this information.    "

## 2025-07-12 ENCOUNTER — HEALTH MAINTENANCE LETTER (OUTPATIENT)
Age: 50
End: 2025-07-12